# Patient Record
Sex: MALE | Race: WHITE | NOT HISPANIC OR LATINO | Employment: FULL TIME | URBAN - METROPOLITAN AREA
[De-identification: names, ages, dates, MRNs, and addresses within clinical notes are randomized per-mention and may not be internally consistent; named-entity substitution may affect disease eponyms.]

---

## 2023-10-02 ENCOUNTER — TELEPHONE (OUTPATIENT)
Dept: PRIMARY CARE | Facility: CLINIC | Age: 63
End: 2023-10-02
Payer: COMMERCIAL

## 2023-10-11 DIAGNOSIS — E78.5 HYPERLIPIDEMIA, UNSPECIFIED: ICD-10-CM

## 2023-10-13 RX ORDER — ICOSAPENT ETHYL 1000 MG/1
2 CAPSULE ORAL 2 TIMES DAILY
Qty: 360 CAPSULE | Refills: 4 | Status: SHIPPED | OUTPATIENT
Start: 2023-10-13

## 2023-11-02 ENCOUNTER — LAB (OUTPATIENT)
Dept: LAB | Facility: LAB | Age: 63
End: 2023-11-02
Payer: COMMERCIAL

## 2023-11-02 DIAGNOSIS — E11.9 TYPE 2 DIABETES MELLITUS WITHOUT COMPLICATIONS (MULTI): Primary | ICD-10-CM

## 2023-11-02 LAB
ALBUMIN SERPL BCP-MCNC: 4.4 G/DL (ref 3.4–5)
ALP SERPL-CCNC: 91 U/L (ref 33–136)
ALT SERPL W P-5'-P-CCNC: 48 U/L (ref 10–52)
ANION GAP SERPL CALC-SCNC: 12 MMOL/L (ref 10–20)
APPEARANCE UR: CLEAR
AST SERPL W P-5'-P-CCNC: 31 U/L (ref 9–39)
BILIRUB SERPL-MCNC: 0.7 MG/DL (ref 0–1.2)
BILIRUB UR STRIP.AUTO-MCNC: NEGATIVE MG/DL
BUN SERPL-MCNC: 17 MG/DL (ref 6–23)
CALCIUM SERPL-MCNC: 8.9 MG/DL (ref 8.6–10.3)
CHLORIDE SERPL-SCNC: 104 MMOL/L (ref 98–107)
CO2 SERPL-SCNC: 26 MMOL/L (ref 21–32)
COLOR UR: YELLOW
CREAT SERPL-MCNC: 1.04 MG/DL (ref 0.5–1.3)
GFR SERPL CREATININE-BSD FRML MDRD: 80 ML/MIN/1.73M*2
GLUCOSE SERPL-MCNC: 137 MG/DL (ref 74–99)
GLUCOSE UR STRIP.AUTO-MCNC: ABNORMAL MG/DL
KETONES UR STRIP.AUTO-MCNC: ABNORMAL MG/DL
LEUKOCYTE ESTERASE UR QL STRIP.AUTO: NEGATIVE
NITRITE UR QL STRIP.AUTO: NEGATIVE
PH UR STRIP.AUTO: 5 [PH]
POTASSIUM SERPL-SCNC: 4.1 MMOL/L (ref 3.5–5.3)
PROT SERPL-MCNC: 7.1 G/DL (ref 6.4–8.2)
PROT UR STRIP.AUTO-MCNC: NEGATIVE MG/DL
RBC # UR STRIP.AUTO: NEGATIVE /UL
SODIUM SERPL-SCNC: 138 MMOL/L (ref 136–145)
SP GR UR STRIP.AUTO: 1.03
UROBILINOGEN UR STRIP.AUTO-MCNC: <2 MG/DL

## 2023-11-02 PROCEDURE — 36415 COLL VENOUS BLD VENIPUNCTURE: CPT

## 2023-11-02 PROCEDURE — 82570 ASSAY OF URINE CREATININE: CPT

## 2023-11-02 PROCEDURE — 83036 HEMOGLOBIN GLYCOSYLATED A1C: CPT

## 2023-11-02 PROCEDURE — 80053 COMPREHEN METABOLIC PANEL: CPT

## 2023-11-02 PROCEDURE — 82043 UR ALBUMIN QUANTITATIVE: CPT

## 2023-11-02 PROCEDURE — 81003 URINALYSIS AUTO W/O SCOPE: CPT

## 2023-11-03 LAB
CREAT UR-MCNC: 111 MG/DL (ref 20–370)
EST. AVERAGE GLUCOSE BLD GHB EST-MCNC: 151 MG/DL
HBA1C MFR BLD: 6.9 %
MICROALBUMIN UR-MCNC: 7.4 MG/L
MICROALBUMIN/CREAT UR: 6.7 UG/MG CREAT

## 2023-11-10 PROBLEM — E11.9 DIABETES (MULTI): Status: ACTIVE | Noted: 2019-08-19

## 2023-11-10 PROBLEM — N52.9 ERECTILE DYSFUNCTION: Status: ACTIVE | Noted: 2022-04-13

## 2023-11-10 PROBLEM — R07.9 CHEST PAIN: Status: ACTIVE | Noted: 2023-11-10

## 2023-11-10 PROBLEM — L82.1 SEBORRHEIC KERATOSES: Status: ACTIVE | Noted: 2019-08-28

## 2023-11-10 PROBLEM — S43.006A DISLOCATION OF SHOULDER JOINT: Status: ACTIVE | Noted: 2023-11-10

## 2023-11-10 PROBLEM — E78.00 PURE HYPERCHOLESTEROLEMIA, UNSPECIFIED: Status: ACTIVE | Noted: 2020-01-31

## 2023-11-10 PROBLEM — L72.3 SEBACEOUS CYST: Status: ACTIVE | Noted: 2019-08-28

## 2023-11-10 RX ORDER — SILDENAFIL 100 MG/1
100 TABLET, FILM COATED ORAL DAILY
COMMUNITY
Start: 2023-05-09 | End: 2023-11-13 | Stop reason: SDUPTHER

## 2023-11-10 RX ORDER — EMPAGLIFLOZIN 25 MG/1
25 TABLET, FILM COATED ORAL
COMMUNITY
Start: 2023-08-17 | End: 2024-05-14 | Stop reason: SDUPTHER

## 2023-11-10 RX ORDER — ROSUVASTATIN CALCIUM 20 MG/1
20 TABLET, COATED ORAL DAILY
COMMUNITY
End: 2024-05-13

## 2023-11-10 RX ORDER — IBUPROFEN 200 MG
200 TABLET ORAL EVERY 6 HOURS PRN
COMMUNITY
Start: 2016-12-08 | End: 2024-04-06

## 2023-11-10 RX ORDER — MINERAL OIL
ENEMA (ML) RECTAL
COMMUNITY
Start: 2018-04-20

## 2023-11-10 RX ORDER — MULTIVITAMIN
TABLET ORAL
COMMUNITY
Start: 2005-10-06

## 2023-11-10 RX ORDER — ASPIRIN 81 MG/1
81 TABLET ORAL DAILY
COMMUNITY
Start: 2019-08-28

## 2023-11-13 ENCOUNTER — OFFICE VISIT (OUTPATIENT)
Dept: PRIMARY CARE | Facility: CLINIC | Age: 63
End: 2023-11-13
Payer: COMMERCIAL

## 2023-11-13 ENCOUNTER — TELEPHONE (OUTPATIENT)
Dept: PRIMARY CARE | Facility: CLINIC | Age: 63
End: 2023-11-13

## 2023-11-13 VITALS
RESPIRATION RATE: 16 BRPM | WEIGHT: 176 LBS | DIASTOLIC BLOOD PRESSURE: 80 MMHG | SYSTOLIC BLOOD PRESSURE: 132 MMHG | BODY MASS INDEX: 26.76 KG/M2 | OXYGEN SATURATION: 96 % | HEART RATE: 66 BPM

## 2023-11-13 DIAGNOSIS — Z00.00 WELL ADULT EXAM: ICD-10-CM

## 2023-11-13 DIAGNOSIS — E11.9 TYPE 2 DIABETES MELLITUS WITHOUT COMPLICATION, WITHOUT LONG-TERM CURRENT USE OF INSULIN (MULTI): ICD-10-CM

## 2023-11-13 DIAGNOSIS — N52.9 ERECTILE DYSFUNCTION, UNSPECIFIED ERECTILE DYSFUNCTION TYPE: ICD-10-CM

## 2023-11-13 DIAGNOSIS — E11.9 TYPE 2 DIABETES MELLITUS WITHOUT COMPLICATION, WITHOUT LONG-TERM CURRENT USE OF INSULIN (MULTI): Primary | ICD-10-CM

## 2023-11-13 DIAGNOSIS — E78.00 PURE HYPERCHOLESTEROLEMIA, UNSPECIFIED: ICD-10-CM

## 2023-11-13 DIAGNOSIS — Z12.5 SCREENING FOR PROSTATE CANCER: ICD-10-CM

## 2023-11-13 PROBLEM — L82.1 SEBORRHEIC KERATOSES: Status: RESOLVED | Noted: 2019-08-28 | Resolved: 2023-11-13

## 2023-11-13 PROBLEM — L72.3 SEBACEOUS CYST: Status: RESOLVED | Noted: 2019-08-28 | Resolved: 2023-11-13

## 2023-11-13 PROBLEM — S43.006A DISLOCATION OF SHOULDER JOINT: Status: RESOLVED | Noted: 2023-11-10 | Resolved: 2023-11-13

## 2023-11-13 PROBLEM — R07.9 CHEST PAIN: Status: RESOLVED | Noted: 2023-11-10 | Resolved: 2023-11-13

## 2023-11-13 PROCEDURE — 1036F TOBACCO NON-USER: CPT | Performed by: FAMILY MEDICINE

## 2023-11-13 PROCEDURE — 99213 OFFICE O/P EST LOW 20 MIN: CPT | Performed by: FAMILY MEDICINE

## 2023-11-13 PROCEDURE — 3075F SYST BP GE 130 - 139MM HG: CPT | Performed by: FAMILY MEDICINE

## 2023-11-13 PROCEDURE — 3008F BODY MASS INDEX DOCD: CPT | Performed by: FAMILY MEDICINE

## 2023-11-13 PROCEDURE — 1160F RVW MEDS BY RX/DR IN RCRD: CPT | Performed by: FAMILY MEDICINE

## 2023-11-13 PROCEDURE — 1159F MED LIST DOCD IN RCRD: CPT | Performed by: FAMILY MEDICINE

## 2023-11-13 PROCEDURE — 3044F HG A1C LEVEL LT 7.0%: CPT | Performed by: FAMILY MEDICINE

## 2023-11-13 PROCEDURE — 3079F DIAST BP 80-89 MM HG: CPT | Performed by: FAMILY MEDICINE

## 2023-11-13 PROCEDURE — 3061F NEG MICROALBUMINURIA REV: CPT | Performed by: FAMILY MEDICINE

## 2023-11-13 PROCEDURE — 1126F AMNT PAIN NOTED NONE PRSNT: CPT | Performed by: FAMILY MEDICINE

## 2023-11-13 RX ORDER — SILDENAFIL 100 MG/1
100 TABLET, FILM COATED ORAL AS NEEDED
Qty: 10 TABLET | Refills: 0 | Status: SHIPPED | OUTPATIENT
Start: 2023-11-13

## 2023-11-13 ASSESSMENT — PATIENT HEALTH QUESTIONNAIRE - PHQ9
2. FEELING DOWN, DEPRESSED OR HOPELESS: NOT AT ALL
SUM OF ALL RESPONSES TO PHQ9 QUESTIONS 1 AND 2: 0
1. LITTLE INTEREST OR PLEASURE IN DOING THINGS: NOT AT ALL

## 2023-11-13 ASSESSMENT — PAIN SCALES - GENERAL: PAINLEVEL: 0-NO PAIN

## 2023-11-13 ASSESSMENT — ENCOUNTER SYMPTOMS
OCCASIONAL FEELINGS OF UNSTEADINESS: 0
LOSS OF SENSATION IN FEET: 0
DEPRESSION: 0

## 2023-11-13 NOTE — LETTER
November 13, 2023     Patient: Dieter Wesley   YOB: 1960   Date of Visit: 11/13/2023       To Whom It May Concern:    Dieter Wesley was seen in my clinic on 11/13/2023 at 8:00 am. Please excuse Dieter for his absence from work on this day to make the appointment. He should also be excused for a lab appointment on 11/2/2023.    If you have any questions or concerns, please don't hesitate to call.         Sincerely,         Joseph Trimble MD        CC: No Recipients

## 2023-11-13 NOTE — PROGRESS NOTES
Subjective   Patient ID: Dieter Wesley is a 63 y.o. male who presents for Diabetes.    HPI   1. DIETER is seen today also for follow up of Hypercholesterolemia.  He is on Rosuvastatin and Vascepta.     2. DIETER is seen also for follow up of Diabetes 2, non insulin requiring.  He is on Jardiance 25 mg. (increased 5 months ago).   Recent A1 c is 6.9.    Review of Systems  Denies headache, blurred vision, chest pain, shortness of breath, nausea or vomiting, change in bowel habits or leg pain or swelling.    Objective   /80 (BP Location: Left arm, Patient Position: Sitting, BP Cuff Size: Large adult)   Pulse 66   Resp 16   Wt 79.8 kg (176 lb)   SpO2 96%   BMI 26.76 kg/m²     Physical Exam  Vitals and nurse's notes reviewed  General: no acute distress  HEENT: Normal  Neck: Supple  Lungs: Clear  Cardio: RRR w/o murmur  Extremities: No edema, no calf tenderness  Neuro: Alert and oriented with no focal deficits    Assessment/Plan   Problem List Items Addressed This Visit             ICD-10-CM       High    Type 2 diabetes mellitus without complications (CMS/HCC) - Primary E11.9     Continue current medications.  Recheck in 6 months.           Pure hypercholesterolemia, unspecified E78.00     Continue current medications.  Recheck in 6 months.              Medium    Erectile dysfunction N52.9    Relevant Medications    sildenafil (Viagra) 100 mg tablet

## 2024-03-19 ENCOUNTER — CLINICAL SUPPORT (OUTPATIENT)
Dept: AUDIOLOGY | Facility: CLINIC | Age: 64
End: 2024-03-19
Payer: COMMERCIAL

## 2024-03-19 DIAGNOSIS — H93.13 TINNITUS OF BOTH EARS: ICD-10-CM

## 2024-03-19 DIAGNOSIS — H90.3 SENSORINEURAL HEARING LOSS (SNHL) OF BOTH EARS: Primary | ICD-10-CM

## 2024-03-19 PROCEDURE — 92550 TYMPANOMETRY & REFLEX THRESH: CPT | Performed by: AUDIOLOGIST

## 2024-03-19 PROCEDURE — 92557 COMPREHENSIVE HEARING TEST: CPT | Performed by: AUDIOLOGIST

## 2024-03-19 ASSESSMENT — PAIN - FUNCTIONAL ASSESSMENT: PAIN_FUNCTIONAL_ASSESSMENT: 0-10

## 2024-03-19 ASSESSMENT — PAIN SCALES - GENERAL: PAINLEVEL_OUTOF10: 0 - NO PAIN

## 2024-03-19 ASSESSMENT — ENCOUNTER SYMPTOMS: OCCASIONAL FEELINGS OF UNSTEADINESS: 0

## 2024-03-19 NOTE — PROGRESS NOTES
AUDIOLOGY ADULT AUDIOMETRIC EVALUATION    Name:  Dieter Wesley  :  1960  Age:  63 y.o.  Date of Evaluation:  2024    Reason for visit: Mr. Wesley is seen in the clinic today at the request of Max Verma MD in otolaryngology for an audiologic evaluation. Patient complains of tinnitus and difficulty hearing.    HISTORY  Patient reports constant tinnitus (ringing) in both ears of longstanding. He also reports increased difficulty hearing in groups, in background noise, and at a distance. 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 Patient denies aural fullness/pressure, otalgia, otorrhea, dizziness/balance disturbance, a history of otologic surgery, and a family history of hearing loss. Case history was obtained from the patient.    No prior audiologic evaluation is available for comparison.    SCREENINGS  Steadi Fall Risk  One or more falls in the last year? No  Feels unsteady when walking? No  Worried about Falling? No    Domestic Violence Screening  Are you currently or have you recently been threatened or abused physically, emotionally, or sexually by anyone? No  Do you feel UNSAFE going back to the place you are living? No    Pain Assessment  Pain Assessment: 0-10  Pain Score: 0 - No pain    EVALUATION  See scanned audiogram: “Media” > “Audiology Report” > Document ID [id number].    RESULTS  Otoscopic Evaluation  Right Ear: minimal non-occluding cerumen with visualization of the tympanic membrane  Left Ear: minimal non-occluding cerumen with visualization of the tympanic membrane    Immittance Measures  Tympanometry:  Right Ear: Type A, normal tympanic membrane mobility with normal middle ear pressure  Left Ear: Type A, normal tympanic membrane mobility with normal middle ear pressure    Acoustic Reflexes:  Ipsilateral Right Ear: present and normal from 500 Hz to 2000 Hz, absent at 4000 Hz  Ipsilateral Left Ear: present  and normal from 500 Hz to 2000 Hz, absent at 4000 Hz  Contralateral Right Ear: did not evaluate  Contralateral Left Ear: did not evaluate    Distortion Product Otoacoustic Emissions (DPOAEs)  Right Ear: present from 1000 Hz to 2000 Hz, absent from 3000 Hz to 8000 Hz  Left Ear: present from 1000 Hz to 1500 Hz, absent from 2000 Hz to 8000 Hz    Audiometry  Test Technique and Reliability:   Standard audiometry via supra-aural headphones. Pulsed tone stimulus. Reliability is good.    Pure tone air and bone conduction audiometry:  Right Ear: normal hearing sensitivity through 2000 Hz with a moderate to moderately-severe sensorineural hearing loss in the higher frequencies  Left Ear: normal hearing sensitivity through 2000 Hz with a moderate to moderately-severe sensorineural hearing loss in the higher frequencies    Speech Audiometry:  Speech Reception Threshold (SRT) Right Ear: 25 dB HL  Speech Reception Threshold (SRT) Left Ear: 25 dB HL  Word Recognition Score (WRS) Right Ear: Good, 88% at 65 dB HL  Word Recognition Score (WRS) Left Ear: Excellent, 96% at 65 dB HL    IMPRESSIONS  Audiometric evaluation revealed a high frequency sensorineural hearing loss bilaterally. Tympanometry indicates normal tympanic membrane mobility with normal middle ear pressure bilaterally. No prior audiologic evaluation is available for comparison. The presence of acoustic reflexes within normal intensity limits is consistent with normal middle ear and brainstem function, and suggests that auditory sensitivity is not significantly impaired. Present Distortion Product Otoacoustic Emissions (DPOAEs) suggest normal/near normal cochlear outer hair cell function and are consistent with no greater than a mild hearing loss at those frequencies.    Tinnitus management techniques discussed.    RECOMMENDATIONS  - Annual audiologic evaluation, sooner if an acute change is noted.  - Consider hearing aids. Contact insurance to determine if there is an  applicable benefit and where it can be used. Schedule a hearing aid evaluation appointment should he wish to pursue hearing aids through our clinic.  - Follow-up with medical care team as planned.    PATIENT EDUCATION  Discussed results, impressions and recommendations with the patient. Questions were addressed and the patient was encouraged to contact our office should concerns arise.    Time for this encounter: 812-310    Angel Isabel, CCC-A  Licensed Audiologist

## 2024-04-03 ENCOUNTER — APPOINTMENT (OUTPATIENT)
Dept: AUDIOLOGY | Facility: CLINIC | Age: 64
End: 2024-04-03
Payer: COMMERCIAL

## 2024-04-03 ENCOUNTER — OFFICE VISIT (OUTPATIENT)
Dept: OTOLARYNGOLOGY | Facility: CLINIC | Age: 64
End: 2024-04-03
Payer: COMMERCIAL

## 2024-04-03 VITALS — WEIGHT: 172 LBS | HEIGHT: 67 IN | BODY MASS INDEX: 27 KG/M2

## 2024-04-03 DIAGNOSIS — H93.13 TINNITUS OF BOTH EARS: Primary | ICD-10-CM

## 2024-04-03 DIAGNOSIS — H90.3 BILATERAL SENSORINEURAL HEARING LOSS: ICD-10-CM

## 2024-04-03 PROCEDURE — 1160F RVW MEDS BY RX/DR IN RCRD: CPT | Performed by: OTOLARYNGOLOGY

## 2024-04-03 PROCEDURE — 99203 OFFICE O/P NEW LOW 30 MIN: CPT | Performed by: OTOLARYNGOLOGY

## 2024-04-03 PROCEDURE — 3008F BODY MASS INDEX DOCD: CPT | Performed by: OTOLARYNGOLOGY

## 2024-04-03 PROCEDURE — 1159F MED LIST DOCD IN RCRD: CPT | Performed by: OTOLARYNGOLOGY

## 2024-04-03 PROCEDURE — 1036F TOBACCO NON-USER: CPT | Performed by: OTOLARYNGOLOGY

## 2024-04-03 NOTE — PROGRESS NOTES
"[facility]  Dieter Wesley is a 63 y.o. male presents with history of bilateral tinnitus.  Difficult to describe.  Sounds like a seashell.  Nonpulsatile.  No otalgia or otorrhea.  No vertigo.  Audiogram today with bilateral high-frequency sensorineural hearing loss.  Symmetric.  Normal tympanometry.  Normal speech discrimination.  History of noise exposure occupationally many years ago.  Not ongoing.      Past Medical History:   Diagnosis Date    Diabetes (CMS/[facility])             Medications:     Current Outpatient Medications:     aspirin 81 mg EC tablet, Take 1 tablet (81 mg) by mouth once daily., Disp: , Rfl:     fexofenadine (Allegra) 180 mg tablet, Take 1 tablet as needed for allergies, Disp: , Rfl:     ibuprofen 200 mg tablet, Take 1 tablet (200 mg) by mouth every 6 hours if needed., Disp: , Rfl:     Jardiance 25 mg, Take 1 tablet (25 mg) by mouth once daily in the morning. Take before meals., Disp: , Rfl:     multivitamin tablet, 2-3 TABS WEEKLY, Disp: , Rfl:     rosuvastatin (Crestor) 20 mg tablet, Take 1 tablet (20 mg) by mouth once daily., Disp: , Rfl:     sildenafil (Viagra) 100 mg tablet, Take 1 tablet (100 mg) by mouth if needed for erectile dysfunction., Disp: 10 tablet, Rfl: 0    Vascepa 1 gram capsule, TAKE 2 CAPSULES BY MOUTH TWICE A DAY, Disp: 360 capsule, Rfl: 4     Allergies:  Allergies   Allergen Reactions    Grass Pollen Itching     itchy eyes, sneezing.        Physical Exam:  Last Recorded Vitals  Height 1.702 m (5' 7\"), weight 78 kg (172 lb).  General:     General appearance: Well-developed, well-nourished in no acute distress.       Voice:  normal       Head/face: Normal appearance; nontender to palpation     Facial nerve function: Normal and symmetric bilaterally.    Oral/oropharynx:     Oral vestibule: Normal labial and gingival mucosa     Tongue/floor of mouth: Normal without lesion     Oropharynx: Clear.  No lesions present of the hard/soft palate, posterior pharynx    Neck:     Neck: Normal " appearance, trachea midline     Salivary glands: Normal to palpation bilaterally     Lymph nodes: No cervical lymphadenopathy to palpation     Thyroid: No thyromegaly.  No palpable nodules     Range of motion: Normal    Neurological:     Cortical functions: Alert and oriented x3, appropriate affect       Larynx/hypopharynx:     Laryngeal findings: Mirror exam inadequate or limited secondary to enlarged base of tongue and/or excessive gagging    Ear:     Ear canal: Normal bilaterally     Tympanic membrane: Intact and mobile bilaterally     Pinna: Normal bilaterally     Hearing:  Gross hearing assessment normal by voice    Nose:     Visualized using: Anterior rhinoscopy     Nasopharynx: Inadequate mirror exam secondary to gag, anatomy.       Nasal dorsum: Nontraumatic midline appearance     Septum: Midline     Inferior turbinates: Normally sized     Mucosa: Bilateral, pink, normal appearing       ASSESSMENT/PLAN:  Tinnitus protocol reviewed.  Borderline hearing aid candidate.  We discussed.  Recommend masking.  Recommend audiogram in a year to monitor.  Recheck sooner as needed with change        Max Verma MD

## 2024-04-06 ENCOUNTER — HOSPITAL ENCOUNTER (EMERGENCY)
Facility: HOSPITAL | Age: 64
Discharge: HOME | End: 2024-04-06
Attending: EMERGENCY MEDICINE
Payer: COMMERCIAL

## 2024-04-06 ENCOUNTER — APPOINTMENT (OUTPATIENT)
Dept: RADIOLOGY | Facility: HOSPITAL | Age: 64
End: 2024-04-06
Payer: COMMERCIAL

## 2024-04-06 VITALS
DIASTOLIC BLOOD PRESSURE: 91 MMHG | OXYGEN SATURATION: 96 % | RESPIRATION RATE: 16 BRPM | HEIGHT: 68 IN | TEMPERATURE: 98.1 F | WEIGHT: 174.38 LBS | SYSTOLIC BLOOD PRESSURE: 161 MMHG | HEART RATE: 60 BPM | BODY MASS INDEX: 26.43 KG/M2

## 2024-04-06 DIAGNOSIS — M25.511 ACUTE PAIN OF RIGHT SHOULDER: Primary | ICD-10-CM

## 2024-04-06 PROCEDURE — 73030 X-RAY EXAM OF SHOULDER: CPT | Mod: RT

## 2024-04-06 PROCEDURE — 73030 X-RAY EXAM OF SHOULDER: CPT | Mod: RIGHT SIDE | Performed by: RADIOLOGY

## 2024-04-06 PROCEDURE — 96372 THER/PROPH/DIAG INJ SC/IM: CPT | Performed by: EMERGENCY MEDICINE

## 2024-04-06 PROCEDURE — 2500000001 HC RX 250 WO HCPCS SELF ADMINISTERED DRUGS (ALT 637 FOR MEDICARE OP): Performed by: EMERGENCY MEDICINE

## 2024-04-06 PROCEDURE — 2500000004 HC RX 250 GENERAL PHARMACY W/ HCPCS (ALT 636 FOR OP/ED): Performed by: EMERGENCY MEDICINE

## 2024-04-06 PROCEDURE — 99283 EMERGENCY DEPT VISIT LOW MDM: CPT

## 2024-04-06 RX ORDER — IBUPROFEN 600 MG/1
600 TABLET ORAL EVERY 8 HOURS PRN
Qty: 15 TABLET | Refills: 0 | Status: SHIPPED | OUTPATIENT
Start: 2024-04-06 | End: 2024-04-16

## 2024-04-06 RX ORDER — OXYCODONE AND ACETAMINOPHEN 5; 325 MG/1; MG/1
1 TABLET ORAL ONCE
Status: COMPLETED | OUTPATIENT
Start: 2024-04-06 | End: 2024-04-06

## 2024-04-06 RX ORDER — KETOROLAC TROMETHAMINE 30 MG/ML
30 INJECTION, SOLUTION INTRAMUSCULAR; INTRAVENOUS ONCE
Status: COMPLETED | OUTPATIENT
Start: 2024-04-06 | End: 2024-04-06

## 2024-04-06 RX ADMIN — OXYCODONE HYDROCHLORIDE AND ACETAMINOPHEN 1 TABLET: 5; 325 TABLET ORAL at 14:50

## 2024-04-06 RX ADMIN — KETOROLAC TROMETHAMINE 30 MG: 30 INJECTION, SOLUTION INTRAMUSCULAR at 13:55

## 2024-04-06 ASSESSMENT — COLUMBIA-SUICIDE SEVERITY RATING SCALE - C-SSRS
6. HAVE YOU EVER DONE ANYTHING, STARTED TO DO ANYTHING, OR PREPARED TO DO ANYTHING TO END YOUR LIFE?: NO
2. HAVE YOU ACTUALLY HAD ANY THOUGHTS OF KILLING YOURSELF?: NO
1. IN THE PAST MONTH, HAVE YOU WISHED YOU WERE DEAD OR WISHED YOU COULD GO TO SLEEP AND NOT WAKE UP?: NO

## 2024-04-06 ASSESSMENT — PAIN DESCRIPTION - PAIN TYPE: TYPE: ACUTE PAIN

## 2024-04-06 ASSESSMENT — PAIN DESCRIPTION - PROGRESSION: CLINICAL_PROGRESSION: GRADUALLY WORSENING

## 2024-04-06 ASSESSMENT — PAIN DESCRIPTION - LOCATION: LOCATION: SHOULDER

## 2024-04-06 ASSESSMENT — PAIN DESCRIPTION - DESCRIPTORS: DESCRIPTORS: ACHING

## 2024-04-06 ASSESSMENT — PAIN DESCRIPTION - ORIENTATION: ORIENTATION: RIGHT

## 2024-04-06 ASSESSMENT — PAIN SCALES - GENERAL
PAINLEVEL_OUTOF10: 5 - MODERATE PAIN
PAINLEVEL_OUTOF10: 5 - MODERATE PAIN

## 2024-04-06 ASSESSMENT — PAIN DESCRIPTION - ONSET: ONSET: GRADUAL

## 2024-04-06 ASSESSMENT — PAIN DESCRIPTION - FREQUENCY: FREQUENCY: CONSTANT/CONTINUOUS

## 2024-04-06 ASSESSMENT — PAIN - FUNCTIONAL ASSESSMENT
PAIN_FUNCTIONAL_ASSESSMENT: 0-10
PAIN_FUNCTIONAL_ASSESSMENT: 0-10

## 2024-04-06 NOTE — ED PROVIDER NOTES
HPI   Chief Complaint   Patient presents with    Shoulder Pain     Since Thursday, no falls or trauma, no shoulder issues, moving makes it worse or different, no known trauma, good ROM and MSP       The patient is a 63-year-old male who presents for evaluation of right shoulder pain.  Patient states that 2 days ago he began to notice some pain in the right shoulder.  This was not associated with any injury or trauma.  He does not recall any specific activity that was associated with the onset of the pain.  The onset was somewhat mild and insidious.  Pain has gotten worse over time and has made it difficult for him to sleep.  The pain is worse with shoulder movements.  He points to the posterior aspect of the shoulder as the location of maximal pain.  He also has been having some pain in the right trapezius musculature.  He is not having any neck pain.  Sometimes it feels like the pain radiates down his arm.  No numbness or weakness in the right upper extremity.  No history of shoulder problems in the past.  No chest pain or shortness of breath.  No recent fevers.                          No data recorded                   Patient History   Past Medical History:   Diagnosis Date    Diabetes (CMS/HCC)      Past Surgical History:   Procedure Laterality Date    SHOULDER SURGERY       No family history on file.  Social History     Tobacco Use    Smoking status: Never     Passive exposure: Never    Smokeless tobacco: Never   Substance Use Topics    Alcohol use: Yes     Alcohol/week: 7.0 standard drinks of alcohol     Types: 7 Cans of beer per week    Drug use: Never       Physical Exam   ED Triage Vitals [04/06/24 1300]   Temperature Heart Rate Respirations BP   36.7 °C (98.1 °F) 60 16 (!) 161/91      Pulse Ox Temp Source Heart Rate Source Patient Position   96 % Oral Monitor Sitting      BP Location FiO2 (%)     Right arm --       Physical Exam  Constitutional:       General: He is not in acute distress.     Appearance:  Normal appearance. He is not ill-appearing.   HENT:      Head: Normocephalic and atraumatic.      Mouth/Throat:      Mouth: Mucous membranes are moist.      Pharynx: Oropharynx is clear. No oropharyngeal exudate or posterior oropharyngeal erythema.   Eyes:      General:         Right eye: No discharge.         Left eye: No discharge.      Extraocular Movements: Extraocular movements intact.      Conjunctiva/sclera: Conjunctivae normal.      Pupils: Pupils are equal, round, and reactive to light.   Cardiovascular:      Rate and Rhythm: Normal rate and regular rhythm.      Heart sounds: No murmur heard.  Pulmonary:      Effort: Pulmonary effort is normal.      Breath sounds: Normal breath sounds. No wheezing, rhonchi or rales.   Abdominal:      General: Abdomen is flat. There is no distension.      Palpations: Abdomen is soft.      Tenderness: There is no abdominal tenderness.   Musculoskeletal:      Cervical back: Normal range of motion and neck supple. No rigidity or tenderness.      Comments: There is tenderness with palpation at the posterior aspect of the right shoulder.  The patient has increased discomfort with internal and external rotation as well as abduction against resistance.  There is some mild tenderness with palpation in the right trapezius musculature.  There is no C-spine tenderness.  No motor or sensory deficits are detected in the right upper extremity.   Lymphadenopathy:      Cervical: No cervical adenopathy.   Skin:     General: Skin is warm and dry.      Findings: No rash.   Neurological:      Mental Status: He is alert.      Comments: No motor or sensory deficits are detected in the right upper extremity.   Psychiatric:         Mood and Affect: Mood normal.         Behavior: Behavior normal.         ED Course & MDM   Diagnoses as of 04/06/24 1442   Acute pain of right shoulder       Medical Decision Making  The patient was treated with an IM dose of Toradol.  X-rays of the right shoulder were  read by the radiologist as normal.    Findings are consistent with right shoulder pain related to suspected soft tissue inflammation.  This could be from and unrecognized injury.  There is no redness or increased warmth and so I do not suspect a septic arthritis or infectious etiology.  Based on the description of the pain and my physical exam findings, I do not think that this represents a radicular type pain.  I do not feel that this represents a cardiac problem.  The patient was placed in a sling.  He will be given a dose of Percocet prior to discharge.  I am discharging him home on ibuprofen with sling to be worn for comfort.  I recommended alternating ice and heat therapy.  He was given orthopedic referral for follow-up in 2 to 3 days and encouraged to return if feeling worse in any way.        Procedure  Procedures     Jewel Berman DO  04/06/24 1447

## 2024-04-06 NOTE — DISCHARGE INSTRUCTIONS
Wear sling for comfort.    Alternate ice and heat therapy.    Follow-up with the specialist below in 2 to 3 days.

## 2024-05-07 ENCOUNTER — LAB (OUTPATIENT)
Dept: LAB | Facility: LAB | Age: 64
End: 2024-05-07
Payer: COMMERCIAL

## 2024-05-07 DIAGNOSIS — Z00.00 WELL ADULT EXAM: ICD-10-CM

## 2024-05-07 DIAGNOSIS — Z12.5 SCREENING FOR PROSTATE CANCER: ICD-10-CM

## 2024-05-07 DIAGNOSIS — E78.00 PURE HYPERCHOLESTEROLEMIA, UNSPECIFIED: ICD-10-CM

## 2024-05-07 DIAGNOSIS — E11.9 TYPE 2 DIABETES MELLITUS WITHOUT COMPLICATION, WITHOUT LONG-TERM CURRENT USE OF INSULIN (MULTI): ICD-10-CM

## 2024-05-07 LAB
ALBUMIN SERPL BCP-MCNC: 4.5 G/DL (ref 3.4–5)
ALP SERPL-CCNC: 73 U/L (ref 33–136)
ALT SERPL W P-5'-P-CCNC: 32 U/L (ref 10–52)
ANION GAP SERPL CALC-SCNC: 11 MMOL/L (ref 10–20)
APPEARANCE UR: CLEAR
AST SERPL W P-5'-P-CCNC: 24 U/L (ref 9–39)
BASOPHILS # BLD AUTO: 0.02 X10*3/UL (ref 0–0.1)
BASOPHILS NFR BLD AUTO: 0.4 %
BILIRUB SERPL-MCNC: 0.8 MG/DL (ref 0–1.2)
BILIRUB UR STRIP.AUTO-MCNC: NEGATIVE MG/DL
BUN SERPL-MCNC: 14 MG/DL (ref 6–23)
CALCIUM SERPL-MCNC: 9.2 MG/DL (ref 8.6–10.3)
CHLORIDE SERPL-SCNC: 101 MMOL/L (ref 98–107)
CHOLEST SERPL-MCNC: 170 MG/DL (ref 0–199)
CHOLESTEROL/HDL RATIO: 4.2
CO2 SERPL-SCNC: 30 MMOL/L (ref 21–32)
COLOR UR: YELLOW
CREAT SERPL-MCNC: 1.02 MG/DL (ref 0.5–1.3)
EGFRCR SERPLBLD CKD-EPI 2021: 82 ML/MIN/1.73M*2
EOSINOPHIL # BLD AUTO: 0.14 X10*3/UL (ref 0–0.7)
EOSINOPHIL NFR BLD AUTO: 2.8 %
ERYTHROCYTE [DISTWIDTH] IN BLOOD BY AUTOMATED COUNT: 13.5 % (ref 11.5–14.5)
EST. AVERAGE GLUCOSE BLD GHB EST-MCNC: 169 MG/DL
GLUCOSE SERPL-MCNC: 138 MG/DL (ref 74–99)
GLUCOSE UR STRIP.AUTO-MCNC: ABNORMAL MG/DL
HBA1C MFR BLD: 7.5 %
HCT VFR BLD AUTO: 52.2 % (ref 41–52)
HDLC SERPL-MCNC: 40.7 MG/DL
HGB BLD-MCNC: 17 G/DL (ref 13.5–17.5)
IMM GRANULOCYTES # BLD AUTO: 0.02 X10*3/UL (ref 0–0.7)
IMM GRANULOCYTES NFR BLD AUTO: 0.4 % (ref 0–0.9)
KETONES UR STRIP.AUTO-MCNC: NEGATIVE MG/DL
LDLC SERPL CALC-MCNC: 63 MG/DL
LEUKOCYTE ESTERASE UR QL STRIP.AUTO: NEGATIVE
LYMPHOCYTES # BLD AUTO: 1.13 X10*3/UL (ref 1.2–4.8)
LYMPHOCYTES NFR BLD AUTO: 22.6 %
MCH RBC QN AUTO: 29.8 PG (ref 26–34)
MCHC RBC AUTO-ENTMCNC: 32.6 G/DL (ref 32–36)
MCV RBC AUTO: 92 FL (ref 80–100)
MONOCYTES # BLD AUTO: 0.6 X10*3/UL (ref 0.1–1)
MONOCYTES NFR BLD AUTO: 12 %
NEUTROPHILS # BLD AUTO: 3.09 X10*3/UL (ref 1.2–7.7)
NEUTROPHILS NFR BLD AUTO: 61.8 %
NITRITE UR QL STRIP.AUTO: NEGATIVE
NON HDL CHOLESTEROL: 129 MG/DL (ref 0–149)
NRBC BLD-RTO: 0 /100 WBCS (ref 0–0)
PH UR STRIP.AUTO: 6 [PH]
PLATELET # BLD AUTO: 197 X10*3/UL (ref 150–450)
POTASSIUM SERPL-SCNC: 4.5 MMOL/L (ref 3.5–5.3)
PROT SERPL-MCNC: 7 G/DL (ref 6.4–8.2)
PROT UR STRIP.AUTO-MCNC: NEGATIVE MG/DL
RBC # BLD AUTO: 5.7 X10*6/UL (ref 4.5–5.9)
RBC # UR STRIP.AUTO: NEGATIVE /UL
SODIUM SERPL-SCNC: 137 MMOL/L (ref 136–145)
SP GR UR STRIP.AUTO: 1.01
TRIGL SERPL-MCNC: 334 MG/DL (ref 0–149)
UROBILINOGEN UR STRIP.AUTO-MCNC: ABNORMAL MG/DL
VLDL: 67 MG/DL (ref 0–40)
WBC # BLD AUTO: 5 X10*3/UL (ref 4.4–11.3)

## 2024-05-07 PROCEDURE — 80053 COMPREHEN METABOLIC PANEL: CPT

## 2024-05-07 PROCEDURE — 36415 COLL VENOUS BLD VENIPUNCTURE: CPT

## 2024-05-07 PROCEDURE — 80061 LIPID PANEL: CPT

## 2024-05-07 PROCEDURE — 84153 ASSAY OF PSA TOTAL: CPT

## 2024-05-07 PROCEDURE — 84154 ASSAY OF PSA FREE: CPT

## 2024-05-07 PROCEDURE — 83036 HEMOGLOBIN GLYCOSYLATED A1C: CPT

## 2024-05-07 PROCEDURE — 85025 COMPLETE CBC W/AUTO DIFF WBC: CPT

## 2024-05-07 PROCEDURE — 81003 URINALYSIS AUTO W/O SCOPE: CPT

## 2024-05-07 ASSESSMENT — PROMIS GLOBAL HEALTH SCALE
RATE_MENTAL_HEALTH: EXCELLENT
CARRYOUT_SOCIAL_ACTIVITIES: EXCELLENT
RATE_GENERAL_HEALTH: GOOD
RATE_SOCIAL_SATISFACTION: EXCELLENT
RATE_AVERAGE_PAIN: 4
RATE_PHYSICAL_HEALTH: VERY GOOD
RATE_AVERAGE_FATIGUE: MILD
RATE_QUALITY_OF_LIFE: GOOD
EMOTIONAL_PROBLEMS: NEVER
CARRYOUT_PHYSICAL_ACTIVITIES: COMPLETELY

## 2024-05-09 LAB
PSA FREE MFR SERPL: 15 %
PSA FREE SERPL-MCNC: 0.5 NG/ML
PSA SERPL IA-MCNC: 3.4 NG/ML (ref 0–4)

## 2024-05-11 DIAGNOSIS — E78.00 PURE HYPERCHOLESTEROLEMIA, UNSPECIFIED: ICD-10-CM

## 2024-05-13 RX ORDER — ROSUVASTATIN CALCIUM 20 MG/1
20 TABLET, COATED ORAL DAILY
Qty: 90 TABLET | Refills: 3 | Status: SHIPPED | OUTPATIENT
Start: 2024-05-13

## 2024-05-14 ENCOUNTER — OFFICE VISIT (OUTPATIENT)
Dept: PRIMARY CARE | Facility: CLINIC | Age: 64
End: 2024-05-14
Payer: COMMERCIAL

## 2024-05-14 ENCOUNTER — TELEPHONE (OUTPATIENT)
Dept: PRIMARY CARE | Facility: CLINIC | Age: 64
End: 2024-05-14

## 2024-05-14 VITALS
BODY MASS INDEX: 25.76 KG/M2 | RESPIRATION RATE: 18 BRPM | SYSTOLIC BLOOD PRESSURE: 122 MMHG | HEART RATE: 60 BPM | DIASTOLIC BLOOD PRESSURE: 76 MMHG | HEIGHT: 68 IN | OXYGEN SATURATION: 98 % | WEIGHT: 170 LBS

## 2024-05-14 DIAGNOSIS — E11.9 TYPE 2 DIABETES MELLITUS WITHOUT COMPLICATION, WITHOUT LONG-TERM CURRENT USE OF INSULIN (MULTI): ICD-10-CM

## 2024-05-14 DIAGNOSIS — Z00.00 WELL ADULT EXAM: ICD-10-CM

## 2024-05-14 DIAGNOSIS — E78.00 PURE HYPERCHOLESTEROLEMIA, UNSPECIFIED: Primary | ICD-10-CM

## 2024-05-14 DIAGNOSIS — E78.00 PURE HYPERCHOLESTEROLEMIA, UNSPECIFIED: ICD-10-CM

## 2024-05-14 PROCEDURE — 3048F LDL-C <100 MG/DL: CPT | Performed by: FAMILY MEDICINE

## 2024-05-14 PROCEDURE — 3074F SYST BP LT 130 MM HG: CPT | Performed by: FAMILY MEDICINE

## 2024-05-14 PROCEDURE — 99397 PER PM REEVAL EST PAT 65+ YR: CPT | Performed by: FAMILY MEDICINE

## 2024-05-14 PROCEDURE — 3051F HG A1C>EQUAL 7.0%<8.0%: CPT | Performed by: FAMILY MEDICINE

## 2024-05-14 PROCEDURE — 1160F RVW MEDS BY RX/DR IN RCRD: CPT | Performed by: FAMILY MEDICINE

## 2024-05-14 PROCEDURE — 1159F MED LIST DOCD IN RCRD: CPT | Performed by: FAMILY MEDICINE

## 2024-05-14 PROCEDURE — 1125F AMNT PAIN NOTED PAIN PRSNT: CPT | Performed by: FAMILY MEDICINE

## 2024-05-14 PROCEDURE — 3078F DIAST BP <80 MM HG: CPT | Performed by: FAMILY MEDICINE

## 2024-05-14 PROCEDURE — 99212 OFFICE O/P EST SF 10 MIN: CPT | Performed by: FAMILY MEDICINE

## 2024-05-14 PROCEDURE — 1036F TOBACCO NON-USER: CPT | Performed by: FAMILY MEDICINE

## 2024-05-14 RX ORDER — EMPAGLIFLOZIN 25 MG/1
25 TABLET, FILM COATED ORAL
Qty: 90 TABLET | Refills: 3 | Status: SHIPPED | OUTPATIENT
Start: 2024-05-14

## 2024-05-14 ASSESSMENT — PAIN SCALES - GENERAL: PAINLEVEL: 4

## 2024-05-14 ASSESSMENT — ENCOUNTER SYMPTOMS
DEPRESSION: 0
OCCASIONAL FEELINGS OF UNSTEADINESS: 0
LOSS OF SENSATION IN FEET: 0

## 2024-05-14 ASSESSMENT — PATIENT HEALTH QUESTIONNAIRE - PHQ9
1. LITTLE INTEREST OR PLEASURE IN DOING THINGS: NOT AT ALL
2. FEELING DOWN, DEPRESSED OR HOPELESS: NOT AT ALL
SUM OF ALL RESPONSES TO PHQ9 QUESTIONS 1 AND 2: 0

## 2024-05-14 NOTE — PROGRESS NOTES
"Subjective   Patient ID: Dieter Wesley is a 63 y.o. male who presents for Annual Exam (Patient is here for his annual wellness exam).    HPI   1. DIETER is seen for for his comprehensive physical exam. PMH, PSH, family history and social history were reviewed and updated.  Colonoscopy in 2022 by Dr. Ariza was normal.       2. DIETER is seen today also for follow up of Hypercholesterolemia.  He is on Rosuvastatin and Vascepta .Recent LDL is 71.     3. DIETER is seen today also for follow up of allergies.  His SX are controlled by Allegra PRN.     4. DIETER is seen also for follow up of Diabetes 2, non insulin requiring.  He is on Jardiance 25 mg.   Recent A1 c is 7.5  He admits to not following a good diet over the past 6 months or so.He had a calcium CT score of 0 in 12/22.     Review of Systems  Denies headache, blurred vision, chest pain, shortness of breath, nausea or vomiting, change in bowel habits or leg pain or swelling.    Objective   /76 (BP Location: Left arm, Patient Position: Sitting, BP Cuff Size: Large adult)   Pulse 60   Resp 18   Ht 1.727 m (5' 8\")   Wt 77.1 kg (170 lb)   SpO2 98%   BMI 25.85 kg/m²     Physical Exam  General appearance: Vital signs have been reviewed.  Comfortable.  Well-nourished and well-developed.He is alert and oriented x3 and appears his stated age.The patient is cooperative with exam.  Head: Hair pattern reveals a normal pattern for patient's age and face shows no abnormalities.  Eyes: PERRLA x2, EOMI x2, conjunctive a and sclera are clear.  Ears: External bilateral ears with normal helix, tragus and earlobe.Bilateral canals are normal.Bilateral tympanic membranes are pearly gray and landmarks are well visualized.  Nose: Nasal mucosa both nostrils reveals no polyps, ulcerations or lesions.  Throat:Teeth are in good repair.  Posterior pharynx reveals no abnormalities.  Neck: Supple without lymphadenopathy, thyromegaly or carotid bruits.  Lungs:Clear to auscultation " bilaterally with no wheezes, rales or rhonchi.  Cardiovascular: RRR without MRG.No carotid bruits.  Extremities without edema and pulses are intact.  Abdomen: Soft, NT, no masses, no hepatosplenomegaly.  Genitalia: No testicular masses.  No evidence of inguinal hernia.Nontender.  Rectal: No masses.  Prostate is normal in size and shape with no nodules. It is nontender.  Musculoskeletal: 5/5 and equal strength in bilateral upper and lower extremities.  Skin: Good turgor and without rashes.  Neurological: Good overall strength and no focal deficits.  Cranial nerves II through XII are grossly intact.  Psychiatric: Patient has appropriate judgment with good insight.  Mood is appropriate.    Assessment/Plan

## 2024-05-14 NOTE — LETTER
May 14, 2024     Patient: Dieter Wesley   YOB: 1960   Date of Visit: 5/14/2024       To Whom It May Concern:    Dieter Wesley was seen in my clinic on 5/14/2024 at 9:30 am. Please excuse Dieter for his absence from work on this day to make the appointment.    If you have any questions or concerns, please don't hesitate to call.         Sincerely,         Joseph Trimble MD        CC: No Recipients

## 2024-10-21 DIAGNOSIS — E78.5 HYPERLIPIDEMIA, UNSPECIFIED: ICD-10-CM

## 2024-10-21 RX ORDER — ICOSAPENT ETHYL 1 G/1
2 CAPSULE ORAL 2 TIMES DAILY
Qty: 360 CAPSULE | Refills: 4 | Status: SHIPPED | OUTPATIENT
Start: 2024-10-21

## 2024-11-05 ENCOUNTER — LAB (OUTPATIENT)
Dept: LAB | Facility: LAB | Age: 64
End: 2024-11-05
Payer: COMMERCIAL

## 2024-11-05 DIAGNOSIS — E78.00 PURE HYPERCHOLESTEROLEMIA, UNSPECIFIED: ICD-10-CM

## 2024-11-05 DIAGNOSIS — E11.9 TYPE 2 DIABETES MELLITUS WITHOUT COMPLICATION, WITHOUT LONG-TERM CURRENT USE OF INSULIN (MULTI): ICD-10-CM

## 2024-11-05 LAB
ALBUMIN SERPL BCP-MCNC: 4.4 G/DL (ref 3.4–5)
ALP SERPL-CCNC: 77 U/L (ref 33–136)
ALT SERPL W P-5'-P-CCNC: 48 U/L (ref 10–52)
ANION GAP SERPL CALC-SCNC: 16 MMOL/L (ref 10–20)
APPEARANCE UR: CLEAR
AST SERPL W P-5'-P-CCNC: 30 U/L (ref 9–39)
BILIRUB SERPL-MCNC: 0.6 MG/DL (ref 0–1.2)
BILIRUB UR STRIP.AUTO-MCNC: NEGATIVE MG/DL
BUN SERPL-MCNC: 17 MG/DL (ref 6–23)
CALCIUM SERPL-MCNC: 9 MG/DL (ref 8.6–10.3)
CHLORIDE SERPL-SCNC: 102 MMOL/L (ref 98–107)
CHOLEST SERPL-MCNC: 196 MG/DL (ref 0–199)
CHOLESTEROL/HDL RATIO: 5.2
CO2 SERPL-SCNC: 25 MMOL/L (ref 21–32)
COLOR UR: YELLOW
CREAT SERPL-MCNC: 0.9 MG/DL (ref 0.5–1.3)
EGFRCR SERPLBLD CKD-EPI 2021: >90 ML/MIN/1.73M*2
EST. AVERAGE GLUCOSE BLD GHB EST-MCNC: 171 MG/DL
GLUCOSE SERPL-MCNC: 164 MG/DL (ref 74–99)
GLUCOSE UR STRIP.AUTO-MCNC: ABNORMAL MG/DL
HBA1C MFR BLD: 7.6 %
HDLC SERPL-MCNC: 37.5 MG/DL
KETONES UR STRIP.AUTO-MCNC: NEGATIVE MG/DL
LDLC SERPL CALC-MCNC: ABNORMAL MG/DL
LEUKOCYTE ESTERASE UR QL STRIP.AUTO: NEGATIVE
NITRITE UR QL STRIP.AUTO: NEGATIVE
NON HDL CHOLESTEROL: 159 MG/DL (ref 0–149)
PH UR STRIP.AUTO: 5 [PH]
POTASSIUM SERPL-SCNC: 4.1 MMOL/L (ref 3.5–5.3)
PROT SERPL-MCNC: 6.8 G/DL (ref 6.4–8.2)
PROT UR STRIP.AUTO-MCNC: NEGATIVE MG/DL
RBC # UR STRIP.AUTO: NEGATIVE /UL
SODIUM SERPL-SCNC: 139 MMOL/L (ref 136–145)
SP GR UR STRIP.AUTO: 1.05
TRIGL SERPL-MCNC: 600 MG/DL (ref 0–149)
UROBILINOGEN UR STRIP.AUTO-MCNC: NORMAL MG/DL
VLDL: ABNORMAL

## 2024-11-05 PROCEDURE — 80061 LIPID PANEL: CPT

## 2024-11-05 PROCEDURE — 80053 COMPREHEN METABOLIC PANEL: CPT

## 2024-11-05 PROCEDURE — 83036 HEMOGLOBIN GLYCOSYLATED A1C: CPT

## 2024-11-05 PROCEDURE — 36415 COLL VENOUS BLD VENIPUNCTURE: CPT

## 2024-11-05 PROCEDURE — 81003 URINALYSIS AUTO W/O SCOPE: CPT

## 2024-11-09 ASSESSMENT — ENCOUNTER SYMPTOMS
VISUAL CHANGE: 0
SPEECH DIFFICULTY: 0
TREMORS: 0
WEIGHT LOSS: 0
NERVOUS/ANXIOUS: 0
SWEATS: 0
HEADACHES: 0
FATIGUE: 0
POLYPHAGIA: 1
POLYDIPSIA: 0
BLURRED VISION: 0
CONFUSION: 0
DIZZINESS: 0
WEAKNESS: 0
HUNGER: 0
BLACKOUTS: 0
SEIZURES: 0

## 2024-11-15 ENCOUNTER — TELEPHONE (OUTPATIENT)
Dept: PRIMARY CARE | Facility: CLINIC | Age: 64
End: 2024-11-15

## 2024-11-15 ENCOUNTER — APPOINTMENT (OUTPATIENT)
Dept: PRIMARY CARE | Facility: CLINIC | Age: 64
End: 2024-11-15
Payer: COMMERCIAL

## 2024-11-15 VITALS
SYSTOLIC BLOOD PRESSURE: 110 MMHG | DIASTOLIC BLOOD PRESSURE: 64 MMHG | WEIGHT: 174 LBS | BODY MASS INDEX: 26.46 KG/M2 | HEART RATE: 70 BPM | TEMPERATURE: 96.8 F | RESPIRATION RATE: 16 BRPM | OXYGEN SATURATION: 96 %

## 2024-11-15 DIAGNOSIS — E11.9 TYPE 2 DIABETES MELLITUS WITHOUT COMPLICATION, WITHOUT LONG-TERM CURRENT USE OF INSULIN (MULTI): Primary | ICD-10-CM

## 2024-11-15 DIAGNOSIS — Z00.00 WELL ADULT EXAM: Primary | ICD-10-CM

## 2024-11-15 DIAGNOSIS — Z12.5 SCREENING FOR PROSTATE CANCER: ICD-10-CM

## 2024-11-15 DIAGNOSIS — E78.00 PURE HYPERCHOLESTEROLEMIA, UNSPECIFIED: ICD-10-CM

## 2024-11-15 DIAGNOSIS — N52.9 ERECTILE DYSFUNCTION, UNSPECIFIED ERECTILE DYSFUNCTION TYPE: ICD-10-CM

## 2024-11-15 DIAGNOSIS — E11.9 TYPE 2 DIABETES MELLITUS WITHOUT COMPLICATION, WITHOUT LONG-TERM CURRENT USE OF INSULIN (MULTI): ICD-10-CM

## 2024-11-15 PROBLEM — G58.9 PINCHED NERVE IN NECK: Status: ACTIVE | Noted: 2024-04-04

## 2024-11-15 PROBLEM — D23.61: Status: ACTIVE | Noted: 2024-02-01

## 2024-11-15 PROCEDURE — 3078F DIAST BP <80 MM HG: CPT | Performed by: FAMILY MEDICINE

## 2024-11-15 PROCEDURE — 3051F HG A1C>EQUAL 7.0%<8.0%: CPT | Performed by: FAMILY MEDICINE

## 2024-11-15 PROCEDURE — 3048F LDL-C <100 MG/DL: CPT | Performed by: FAMILY MEDICINE

## 2024-11-15 PROCEDURE — 1159F MED LIST DOCD IN RCRD: CPT | Performed by: FAMILY MEDICINE

## 2024-11-15 PROCEDURE — 99213 OFFICE O/P EST LOW 20 MIN: CPT | Performed by: FAMILY MEDICINE

## 2024-11-15 PROCEDURE — 1126F AMNT PAIN NOTED NONE PRSNT: CPT | Performed by: FAMILY MEDICINE

## 2024-11-15 PROCEDURE — 1123F ACP DISCUSS/DSCN MKR DOCD: CPT | Performed by: FAMILY MEDICINE

## 2024-11-15 PROCEDURE — 3074F SYST BP LT 130 MM HG: CPT | Performed by: FAMILY MEDICINE

## 2024-11-15 PROCEDURE — 1036F TOBACCO NON-USER: CPT | Performed by: FAMILY MEDICINE

## 2024-11-15 RX ORDER — SILDENAFIL 100 MG/1
100 TABLET, FILM COATED ORAL AS NEEDED
Qty: 10 TABLET | Refills: 0 | Status: SHIPPED | OUTPATIENT
Start: 2024-11-15

## 2024-11-15 RX ORDER — METFORMIN HYDROCHLORIDE 500 MG/1
500 TABLET, EXTENDED RELEASE ORAL
Qty: 90 TABLET | Refills: 3 | Status: SHIPPED | OUTPATIENT
Start: 2024-11-15 | End: 2025-12-20

## 2024-11-15 ASSESSMENT — PATIENT HEALTH QUESTIONNAIRE - PHQ9
SUM OF ALL RESPONSES TO PHQ9 QUESTIONS 1 AND 2: 0
2. FEELING DOWN, DEPRESSED OR HOPELESS: NOT AT ALL
1. LITTLE INTEREST OR PLEASURE IN DOING THINGS: NOT AT ALL

## 2024-11-15 ASSESSMENT — PAIN SCALES - GENERAL: PAINLEVEL_OUTOF10: 0-NO PAIN

## 2024-11-15 ASSESSMENT — ENCOUNTER SYMPTOMS
DEPRESSION: 0
OCCASIONAL FEELINGS OF UNSTEADINESS: 0
LOSS OF SENSATION IN FEET: 0

## 2024-11-15 NOTE — PROGRESS NOTES
Subjective   Patient ID: Dieter Wesley is a 64 y.o. male who presents for Diabetes (Patient is here for a DM check, patient refused the flu shot today) and Hyperlipidemia (Patient is here for a CHOL check).    HPI   1. DIETER is seen today  for follow up of Hypercholesterolemia.  He is on Rosuvastatin and Vascepta .Recent LDL is 71.     2. DIETER is seen today also for follow up of allergies.  His SX are controlled by Allegra PRN.     3. DIETER is seen also for follow up of Diabetes 2, non insulin requiring.  He is on Jardiance 25 mg.   Recent A1 c is 7.6  had a calcium CT score of 0 in 12/22.    Review of Systems  Denies headache, blurred vision, chest pain, shortness of breath, nausea or vomiting, change in bowel habits or leg pain or swelling.    Objective   /64 (BP Location: Left arm, Patient Position: Sitting, BP Cuff Size: Large adult)   Pulse 70   Temp 36 °C (96.8 °F) (Temporal)   Resp 16   Wt 78.9 kg (174 lb)   SpO2 96%   BMI 26.46 kg/m²     Physical Exam  Vitals and nurse's notes reviewed  General: no acute distress  HEENT: Normal  Neck: Supple  Lungs: Clear  Cardio: RRR w/o murmur  Extremities: No edema, no calf tenderness  Neuro: Alert and oriented with no focal deficits    Assessment/Plan   Problem List Items Addressed This Visit             ICD-10-CM       High    Type 2 diabetes mellitus without complications (Multi) - Primary E11.9     Continue Jardiance.  At metformin  daily.  We discussed monitoring his sugars and I recommended a CGM.  He wants to think about this.  Recheck with me in 6 months at CPE.         Relevant Medications    metFORMIN XR (Glucophage-XR) 500 mg 24 hr tablet    Pure hypercholesterolemia, unspecified E78.00     Continue current medication.  Recheck in 6 months at CPE.            Medium    Erectile dysfunction N52.9    Relevant Medications    sildenafil (Viagra) 100 mg tablet

## 2024-11-15 NOTE — ASSESSMENT & PLAN NOTE
Continue Jardiance.  At metformin  daily.  We discussed monitoring his sugars and I recommended a CGM.  He wants to think about this.  Recheck with me in 6 months at CPE.

## 2024-11-15 NOTE — LETTER
November 15, 2024     Patient: Dieter Wesley   YOB: 1960   Date of Visit: 11/15/2024       To Whom It May Concern:    Dieter Wesley was seen in my clinic on 11/15/2024 at 8:30 am. Please excuse Dieter for his absence from work on this day to make the appointment.    If you have any questions or concerns, please don't hesitate to call.         Sincerely,         Joseph Trimble MD        CC: No Recipients

## 2025-03-10 ENCOUNTER — OFFICE VISIT (OUTPATIENT)
Dept: PRIMARY CARE | Facility: CLINIC | Age: 65
End: 2025-03-10
Payer: COMMERCIAL

## 2025-03-10 VITALS
OXYGEN SATURATION: 95 % | TEMPERATURE: 97.3 F | HEART RATE: 73 BPM | SYSTOLIC BLOOD PRESSURE: 98 MMHG | BODY MASS INDEX: 23.87 KG/M2 | WEIGHT: 157 LBS | RESPIRATION RATE: 18 BRPM | DIASTOLIC BLOOD PRESSURE: 76 MMHG

## 2025-03-10 DIAGNOSIS — J06.9 VIRAL UPPER RESPIRATORY TRACT INFECTION: Primary | ICD-10-CM

## 2025-03-10 DIAGNOSIS — E11.9 TYPE 2 DIABETES MELLITUS WITHOUT COMPLICATION, WITHOUT LONG-TERM CURRENT USE OF INSULIN (MULTI): ICD-10-CM

## 2025-03-10 PROCEDURE — 3074F SYST BP LT 130 MM HG: CPT | Performed by: FAMILY MEDICINE

## 2025-03-10 PROCEDURE — 1158F ADVNC CARE PLAN TLK DOCD: CPT | Performed by: FAMILY MEDICINE

## 2025-03-10 PROCEDURE — 1159F MED LIST DOCD IN RCRD: CPT | Performed by: FAMILY MEDICINE

## 2025-03-10 PROCEDURE — 1036F TOBACCO NON-USER: CPT | Performed by: FAMILY MEDICINE

## 2025-03-10 PROCEDURE — 1126F AMNT PAIN NOTED NONE PRSNT: CPT | Performed by: FAMILY MEDICINE

## 2025-03-10 PROCEDURE — 1123F ACP DISCUSS/DSCN MKR DOCD: CPT | Performed by: FAMILY MEDICINE

## 2025-03-10 PROCEDURE — 3078F DIAST BP <80 MM HG: CPT | Performed by: FAMILY MEDICINE

## 2025-03-10 PROCEDURE — 99214 OFFICE O/P EST MOD 30 MIN: CPT | Performed by: FAMILY MEDICINE

## 2025-03-10 ASSESSMENT — ENCOUNTER SYMPTOMS
WEIGHT LOSS: 1
DEPRESSION: 0
LOSS OF SENSATION IN FEET: 0
COUGH: 1
RHINORRHEA: 1
OCCASIONAL FEELINGS OF UNSTEADINESS: 0

## 2025-03-10 ASSESSMENT — PAIN SCALES - GENERAL: PAINLEVEL_OUTOF10: 0-NO PAIN

## 2025-03-10 NOTE — ASSESSMENT & PLAN NOTE
I suspect his respiratory symptoms were due to viral infection.  He seems to be recovering on his own with a normal exam and him feeling better.  I do not think his weight loss was related to this infection.  He has no other symptoms and his energy level is good so I suspect it was a physiological weight loss may be due to change in diet since starting the metformin.  No red flags for other more concerning causes of weight loss.  At this point we will check blood work as we have not checked his A1c since starting the metformin.  Will also check CMP CBC and a UA.  If these are stable no further testing is necessary.  He is going to continue to monitor his weight.  If he continues to lose weight he will let me know and we will pursue further workup at that time.

## 2025-03-10 NOTE — PROGRESS NOTES
Subjective   Patient ID: Dieter Wesley is a 64 y.o. male who presents for Cough (Patient is here for a cough, nasal drainage x 3 weeks) and Weight Loss (Patient is here for weight loss x 3 weeks).    HPI   He is here for weight loss and URI symptoms.  About 3 weeks ago he developed cough some congestion which was about a week before heading down to [place].  He went down to [place] and is cough persisted but he felt a little bit better.  The past few days after his return here he is felt even better and his energy is back to normal.  He has been concerned about a weight loss.  When he weighed himself after return to [place] he was down about 10 pounds.  In the past 3 to 4 days when he is weight himself he has gained 1 pound.  He has no GI symptoms.  His appetites been good.  According to his chart in this office he is dropped 17 pounds since November for teeth.  Initially had been trying to lose some weight.  He also started metformin in November.  Overall he says his energy level is good  Review of Systems  Denies headache, blurred vision, chest pain, shortness of breath, nausea or vomiting, change in bowel habits or leg pain or swelling.    Objective   BP 98/76 (BP Location: Left arm, Patient Position: Sitting, BP Cuff Size: Large adult)   Pulse 73   Temp 36.3 °C (97.3 °F) (Temporal)   Resp 18   Wt 71.2 kg (157 lb)   SpO2 95%   BMI 23.87 kg/m²     Physical Exam  Vitals and nurse's notes reviewed  General: no acute distress  HEENT: Normal  Neck: Supple  Lungs: Clear  Cardio: RRR w/o murmur  Abdomen: Soft, nontender, no hepatosplenomegaly  Extremities: No edema, no calf tenderness  Neuro: Alert and oriented with no focal deficits    Assessment/Plan   Problem List Items Addressed This Visit             ICD-10-CM       Medium    Viral upper respiratory tract infection - Primary J06.9     I suspect his respiratory symptoms were due to viral infection.  He seems to be recovering on his own with a normal exam and  him feeling better.  I do not think his weight loss was related to this infection.  He has no other symptoms and his energy level is good so I suspect it was a physiological weight loss may be due to change in diet since starting the metformin.  No red flags for other more concerning causes of weight loss.  At this point we will check blood work as we have not checked his A1c since starting the metformin.  Will also check CMP CBC and a UA.  If these are stable no further testing is necessary.  He is going to continue to monitor his weight.  If he continues to lose weight he will let me know and we will pursue further workup at that time.

## 2025-03-13 LAB
ALBUMIN SERPL-MCNC: 3.9 G/DL (ref 3.6–5.1)
ALP SERPL-CCNC: 74 U/L (ref 35–144)
ALT SERPL-CCNC: 21 U/L (ref 9–46)
ANION GAP SERPL CALCULATED.4IONS-SCNC: 8 MMOL/L (CALC) (ref 7–17)
APPEARANCE UR: CLEAR
AST SERPL-CCNC: 19 U/L (ref 10–35)
BASOPHILS # BLD AUTO: 20 CELLS/UL (ref 0–200)
BASOPHILS NFR BLD AUTO: 0.4 %
BILIRUB SERPL-MCNC: 0.4 MG/DL (ref 0.2–1.2)
BILIRUB UR QL STRIP: NEGATIVE
BUN SERPL-MCNC: 19 MG/DL (ref 7–25)
CALCIUM SERPL-MCNC: 8.7 MG/DL (ref 8.6–10.3)
CHLORIDE SERPL-SCNC: 102 MMOL/L (ref 98–110)
CO2 SERPL-SCNC: 27 MMOL/L (ref 20–32)
COLOR UR: YELLOW
CREAT SERPL-MCNC: 0.82 MG/DL (ref 0.7–1.35)
EGFRCR SERPLBLD CKD-EPI 2021: 97 ML/MIN/1.73M2
EOSINOPHIL # BLD AUTO: 90 CELLS/UL (ref 15–500)
EOSINOPHIL NFR BLD AUTO: 1.8 %
ERYTHROCYTE [DISTWIDTH] IN BLOOD BY AUTOMATED COUNT: 13 % (ref 11–15)
EST. AVERAGE GLUCOSE BLD GHB EST-MCNC: 180 MG/DL
EST. AVERAGE GLUCOSE BLD GHB EST-SCNC: 10 MMOL/L
GLUCOSE SERPL-MCNC: 162 MG/DL (ref 65–99)
GLUCOSE UR QL STRIP: ABNORMAL
HBA1C MFR BLD: 7.9 % OF TOTAL HGB
HCT VFR BLD AUTO: 45.7 % (ref 38.5–50)
HGB BLD-MCNC: 14.8 G/DL (ref 13.2–17.1)
HGB UR QL STRIP: NEGATIVE
KETONES UR QL STRIP: ABNORMAL
LEUKOCYTE ESTERASE UR QL STRIP: NEGATIVE
LYMPHOCYTES # BLD AUTO: 1105 CELLS/UL (ref 850–3900)
LYMPHOCYTES NFR BLD AUTO: 22.1 %
MCH RBC QN AUTO: 29.7 PG (ref 27–33)
MCHC RBC AUTO-ENTMCNC: 32.4 G/DL (ref 32–36)
MCV RBC AUTO: 91.6 FL (ref 80–100)
MONOCYTES # BLD AUTO: 675 CELLS/UL (ref 200–950)
MONOCYTES NFR BLD AUTO: 13.5 %
NEUTROPHILS # BLD AUTO: 3110 CELLS/UL (ref 1500–7800)
NEUTROPHILS NFR BLD AUTO: 62.2 %
NITRITE UR QL STRIP: NEGATIVE
PH UR STRIP: ABNORMAL [PH] (ref 5–8)
PLATELET # BLD AUTO: 303 THOUSAND/UL (ref 140–400)
PMV BLD REES-ECKER: 9.2 FL (ref 7.5–12.5)
POTASSIUM SERPL-SCNC: 4 MMOL/L (ref 3.5–5.3)
PROT SERPL-MCNC: 6.7 G/DL (ref 6.1–8.1)
PROT UR QL STRIP: NEGATIVE
RBC # BLD AUTO: 4.99 MILLION/UL (ref 4.2–5.8)
SODIUM SERPL-SCNC: 137 MMOL/L (ref 135–146)
SP GR UR STRIP: 1.04 (ref 1–1.03)
WBC # BLD AUTO: 5 THOUSAND/UL (ref 3.8–10.8)

## 2025-03-23 ASSESSMENT — ENCOUNTER SYMPTOMS
FATIGUE: 0
SEIZURES: 0
POLYDIPSIA: 0
BLACKOUTS: 0
NERVOUS/ANXIOUS: 0
VISUAL CHANGE: 0
WEAKNESS: 0
CONFUSION: 0
DIZZINESS: 0
TREMORS: 0
POLYPHAGIA: 0
HEADACHES: 0
WEIGHT LOSS: 1
BLURRED VISION: 0
SWEATS: 0
SPEECH DIFFICULTY: 0
HUNGER: 0

## 2025-03-27 ENCOUNTER — APPOINTMENT (OUTPATIENT)
Dept: PRIMARY CARE | Facility: CLINIC | Age: 65
End: 2025-03-27
Payer: COMMERCIAL

## 2025-03-27 VITALS
DIASTOLIC BLOOD PRESSURE: 74 MMHG | HEART RATE: 57 BPM | WEIGHT: 159 LBS | OXYGEN SATURATION: 95 % | SYSTOLIC BLOOD PRESSURE: 102 MMHG | RESPIRATION RATE: 18 BRPM | BODY MASS INDEX: 24.18 KG/M2

## 2025-03-27 DIAGNOSIS — E78.00 PURE HYPERCHOLESTEROLEMIA, UNSPECIFIED: Primary | ICD-10-CM

## 2025-03-27 DIAGNOSIS — E11.9 TYPE 2 DIABETES MELLITUS WITHOUT COMPLICATION, WITHOUT LONG-TERM CURRENT USE OF INSULIN: ICD-10-CM

## 2025-03-27 PROCEDURE — 1159F MED LIST DOCD IN RCRD: CPT | Performed by: FAMILY MEDICINE

## 2025-03-27 PROCEDURE — 99213 OFFICE O/P EST LOW 20 MIN: CPT | Performed by: FAMILY MEDICINE

## 2025-03-27 PROCEDURE — 1036F TOBACCO NON-USER: CPT | Performed by: FAMILY MEDICINE

## 2025-03-27 PROCEDURE — 1123F ACP DISCUSS/DSCN MKR DOCD: CPT | Performed by: FAMILY MEDICINE

## 2025-03-27 PROCEDURE — 1126F AMNT PAIN NOTED NONE PRSNT: CPT | Performed by: FAMILY MEDICINE

## 2025-03-27 PROCEDURE — 1158F ADVNC CARE PLAN TLK DOCD: CPT | Performed by: FAMILY MEDICINE

## 2025-03-27 RX ORDER — BLOOD SUGAR DIAGNOSTIC
1 STRIP MISCELLANEOUS 2 TIMES DAILY
Qty: 200 EACH | Refills: 3 | Status: SHIPPED | OUTPATIENT
Start: 2025-03-27

## 2025-03-27 RX ORDER — LANCETS
EACH MISCELLANEOUS
Qty: 200 EACH | Refills: 3 | Status: SHIPPED | OUTPATIENT
Start: 2025-03-27

## 2025-03-27 RX ORDER — DEXTROSE 4 G
TABLET,CHEWABLE ORAL
Qty: 1 EACH | Refills: 1 | Status: SHIPPED | OUTPATIENT
Start: 2025-03-27

## 2025-03-27 ASSESSMENT — PAIN SCALES - GENERAL: PAINLEVEL_OUTOF10: 0-NO PAIN

## 2025-03-27 ASSESSMENT — PATIENT HEALTH QUESTIONNAIRE - PHQ9
SUM OF ALL RESPONSES TO PHQ9 QUESTIONS 1 AND 2: 0
1. LITTLE INTEREST OR PLEASURE IN DOING THINGS: NOT AT ALL
2. FEELING DOWN, DEPRESSED OR HOPELESS: NOT AT ALL

## 2025-03-27 ASSESSMENT — ENCOUNTER SYMPTOMS
LOSS OF SENSATION IN FEET: 0
DEPRESSION: 0
OCCASIONAL FEELINGS OF UNSTEADINESS: 0

## 2025-03-27 NOTE — PROGRESS NOTES
Subjective   Patient ID: Dieter Wesley is a 64 y.o. male who presents for Follow-up (Patient is here for a follow up on lab work ).    HPI   1. DIETER is seen today  for follow up of Hypercholesterolemia.  He is on Rosuvastatin and Vascepta .     2. DIETER is seen also for follow up of Diabetes 2, non insulin requiring.  He is on Jardiance 25 mg and metformin  mg (started 4 months ago).  Recent A1c is 7.9.  He states he is eating a fairly low-carb diet.     Review of Systems  Denies headache, blurred vision, chest pain, shortness of breath, nausea or vomiting, change in bowel habits or leg pain or swelling.    Objective   /74 (BP Location: Left arm, Patient Position: Sitting, BP Cuff Size: Large adult)   Pulse 57   Resp 18   Wt 72.1 kg (159 lb)   SpO2 95%   BMI 24.18 kg/m²     Physical Exam  Vitals and nurse's notes reviewed  General: no acute distress  HEENT: Normal  Neck: Supple  Lungs: Clear  Cardio: RRR w/o murmur  Extremities: No edema, no calf tenderness  Neuro: Alert and oriented with no focal deficits    Assessment/Plan   Assessment & Plan  Pure hypercholesterolemia, unspecified  Continue rosuvastatin and Vascepa.  Recheck in 3 months as scheduled.         Type 2 diabetes mellitus without complication, without long-term current use of insulin (Multi)  We long discussion regarding his elevated A1c.  At this point we will keep his medications same but he is can start monitoring his sugars.  He prefers a glucometer over CGM.  Will send the prescription for glucometer and he is going to check it once or twice a day fasting the morning and an hour and a half after one of his meals.  He is to return here as scheduled in 3 months at which time we will recheck an A1c.

## 2025-03-27 NOTE — ASSESSMENT & PLAN NOTE
We long discussion regarding his elevated A1c.  At this point we will keep his medications same but he is can start monitoring his sugars.  He prefers a glucometer over CGM.  Will send the prescription for glucometer and he is going to check it once or twice a day fasting the morning and an hour and a half after one of his meals.  He is to return here as scheduled in 3 months at which time we will recheck an A1c.

## 2025-03-29 DIAGNOSIS — E78.00 PURE HYPERCHOLESTEROLEMIA, UNSPECIFIED: ICD-10-CM

## 2025-03-31 RX ORDER — ROSUVASTATIN CALCIUM 20 MG/1
20 TABLET, COATED ORAL DAILY
Qty: 90 TABLET | Refills: 3 | Status: SHIPPED | OUTPATIENT
Start: 2025-03-31

## 2025-04-02 ENCOUNTER — TELEPHONE (OUTPATIENT)
Dept: PRIMARY CARE | Facility: CLINIC | Age: 65
End: 2025-04-02
Payer: COMMERCIAL

## 2025-04-02 ASSESSMENT — ENCOUNTER SYMPTOMS
VOMITING: 1
ANOREXIA: 1
ABDOMINAL PAIN: 1
DIARRHEA: 1
WEIGHT LOSS: 1
FEVER: 1

## 2025-04-02 NOTE — TELEPHONE ENCOUNTER
Marcio's [facility] [place] call to me to schedule an appt for ongoing ABD/back pain has seen MARCIO  for this before he has a CPE 6/13 nothing available until then did not want to wait. Said this is the 4th time this has happened.  ok for a acute spot , told him MARCIO is out

## 2025-04-04 ENCOUNTER — OFFICE VISIT (OUTPATIENT)
Dept: PRIMARY CARE | Facility: CLINIC | Age: 65
End: 2025-04-04
Payer: COMMERCIAL

## 2025-04-04 ENCOUNTER — TELEPHONE (OUTPATIENT)
Dept: PRIMARY CARE | Facility: CLINIC | Age: 65
End: 2025-04-04

## 2025-04-04 VITALS
OXYGEN SATURATION: 99 % | TEMPERATURE: 97.4 F | SYSTOLIC BLOOD PRESSURE: 110 MMHG | HEART RATE: 67 BPM | WEIGHT: 157 LBS | BODY MASS INDEX: 23.87 KG/M2 | DIASTOLIC BLOOD PRESSURE: 60 MMHG | RESPIRATION RATE: 18 BRPM

## 2025-04-04 DIAGNOSIS — R10.30 LOWER ABDOMINAL PAIN: Primary | ICD-10-CM

## 2025-04-04 PROCEDURE — 3078F DIAST BP <80 MM HG: CPT | Performed by: FAMILY MEDICINE

## 2025-04-04 PROCEDURE — 1125F AMNT PAIN NOTED PAIN PRSNT: CPT | Performed by: FAMILY MEDICINE

## 2025-04-04 PROCEDURE — 3074F SYST BP LT 130 MM HG: CPT | Performed by: FAMILY MEDICINE

## 2025-04-04 PROCEDURE — 1036F TOBACCO NON-USER: CPT | Performed by: FAMILY MEDICINE

## 2025-04-04 PROCEDURE — 1123F ACP DISCUSS/DSCN MKR DOCD: CPT | Performed by: FAMILY MEDICINE

## 2025-04-04 PROCEDURE — 1159F MED LIST DOCD IN RCRD: CPT | Performed by: FAMILY MEDICINE

## 2025-04-04 PROCEDURE — 99214 OFFICE O/P EST MOD 30 MIN: CPT | Performed by: FAMILY MEDICINE

## 2025-04-04 ASSESSMENT — PATIENT HEALTH QUESTIONNAIRE - PHQ9
2. FEELING DOWN, DEPRESSED OR HOPELESS: NOT AT ALL
1. LITTLE INTEREST OR PLEASURE IN DOING THINGS: NOT AT ALL
SUM OF ALL RESPONSES TO PHQ9 QUESTIONS 1 AND 2: 0

## 2025-04-04 ASSESSMENT — PAIN SCALES - GENERAL: PAINLEVEL_OUTOF10: 2

## 2025-04-04 NOTE — TELEPHONE ENCOUNTER
Pt called [phone]  he was seen today and given a CT scan order,  he called to schedule and they are requesting him to have labs done. Creatine and GFR, told him Dr Trimble is out this pm

## 2025-04-04 NOTE — PROGRESS NOTES
Subjective   Patient ID: Dieter Wesley is a 64 y.o. male who presents for Follow-up (Patient is here for a follow up for ongoing abd/back pain, patient is not doing any better.).    HPI   He is here for bilateral lower abdominal pain intermittent abdominal distention and diarrhea.  The symptoms have been present starting about a month ago after returning from a trip to [place].  Initially he had a couple episodes where he had some abdominal discomfort and he felt constipated these resolved.  Over the past week he has noticed increasing symptoms and on 2 occasions had severe abdominal distention to the point where he was went to the emergency room.  The last 1 occurred 3 nights ago.  Ever since his last episode has been having diarrhea 3-4 times a day.  Normally has 1 bowel movement every morning.  He had 1 episode of low-grade fever.  He said no vomiting.  He has not been in contact with ambulance with similar symptoms.  No bills on his trip had similar symptoms.  He had a colonoscopy 2 and half years ago which was normal.  He has had no change in urination.  Review of Systems  Denies headache, blurred vision, chest pain, shortness of breath,  or leg pain or swelling.    Objective   /60 (BP Location: Left arm, Patient Position: Sitting, BP Cuff Size: Large adult)   Pulse 67   Temp 36.3 °C (97.4 °F) (Temporal)   Resp 18   Wt 71.2 kg (157 lb)   SpO2 99%   BMI 23.87 kg/m²     Physical Exam  Vitals and nurse's notes reviewed  General: no acute distress  HEENT: Normal  Neck: Supple  Lungs: Clear  Cardio: RRR w/o murmur  Abdomen: Soft, nontender, no hepatosplenomegaly  Extremities: No edema, no calf tenderness  Neuro: Alert and oriented with no focal deficits    Assessment/Plan   Assessment & Plan  Lower abdominal pain  History and exam do not lead to any specific obvious diagnosis.  Possibly could be diverticulitis or enteritis.  Does not sound like typical gallbladder type symptoms.  Could be infectious  but diarrhea only started a few days ago.  Will get abdominal CT scan and notify him of results.  If he has any further severe flareups he should go to the emergency room.  I will notify him of results.  If negative and symptoms persist consider GI referral.    Orders:    CT abdomen pelvis w IV contrast; Future

## 2025-04-04 NOTE — ASSESSMENT & PLAN NOTE
History and exam do not lead to any specific obvious diagnosis.  Possibly could be diverticulitis or enteritis.  Does not sound like typical gallbladder type symptoms.  Could be infectious but diarrhea only started a few days ago.  Will get abdominal CT scan and notify him of results.  If he has any further severe flareups he should go to the emergency room.  I will notify him of results.  If negative and symptoms persist consider GI referral.    Orders:    CT abdomen pelvis w IV contrast; Future

## 2025-04-09 LAB
ANION GAP SERPL CALCULATED.4IONS-SCNC: 10 MMOL/L (CALC) (ref 7–17)
BUN SERPL-MCNC: 15 MG/DL (ref 7–25)
BUN/CREAT SERPL: ABNORMAL (CALC) (ref 6–22)
CALCIUM SERPL-MCNC: 9.1 MG/DL (ref 8.6–10.3)
CHLORIDE SERPL-SCNC: 102 MMOL/L (ref 98–110)
CO2 SERPL-SCNC: 25 MMOL/L (ref 20–32)
CREAT SERPL-MCNC: 0.81 MG/DL (ref 0.7–1.35)
EGFRCR SERPLBLD CKD-EPI 2021: 97 ML/MIN/1.73M2
GLUCOSE SERPL-MCNC: 138 MG/DL (ref 65–99)
POTASSIUM SERPL-SCNC: 4.6 MMOL/L (ref 3.5–5.3)
SODIUM SERPL-SCNC: 137 MMOL/L (ref 135–146)

## 2025-04-14 ENCOUNTER — HOSPITAL ENCOUNTER (OUTPATIENT)
Dept: RADIOLOGY | Facility: HOSPITAL | Age: 65
Discharge: HOME | End: 2025-04-14
Payer: COMMERCIAL

## 2025-04-14 DIAGNOSIS — R10.30 LOWER ABDOMINAL PAIN: ICD-10-CM

## 2025-04-14 PROCEDURE — 74177 CT ABD & PELVIS W/CONTRAST: CPT

## 2025-04-14 PROCEDURE — 2550000001 HC RX 255 CONTRASTS: Performed by: FAMILY MEDICINE

## 2025-04-14 RX ADMIN — IOHEXOL 75 ML: 350 INJECTION, SOLUTION INTRAVENOUS at 15:08

## 2025-05-08 DIAGNOSIS — E11.9 TYPE 2 DIABETES MELLITUS WITHOUT COMPLICATION, WITHOUT LONG-TERM CURRENT USE OF INSULIN: ICD-10-CM

## 2025-05-08 RX ORDER — EMPAGLIFLOZIN 25 MG/1
25 TABLET, FILM COATED ORAL
Qty: 90 TABLET | Refills: 3 | Status: SHIPPED | OUTPATIENT
Start: 2025-05-08

## 2025-06-10 ASSESSMENT — PROMIS GLOBAL HEALTH SCALE
EMOTIONAL_PROBLEMS: NEVER
RATE_GENERAL_HEALTH: VERY GOOD
RATE_QUALITY_OF_LIFE: VERY GOOD
RATE_PHYSICAL_HEALTH: VERY GOOD
CARRYOUT_SOCIAL_ACTIVITIES: VERY GOOD
RATE_MENTAL_HEALTH: EXCELLENT
RATE_SOCIAL_SATISFACTION: EXCELLENT
CARRYOUT_PHYSICAL_ACTIVITIES: MOSTLY
RATE_AVERAGE_PAIN: 4
RATE_AVERAGE_FATIGUE: MILD

## 2025-06-11 DIAGNOSIS — G58.9 PINCHED NERVE IN NECK: Primary | ICD-10-CM

## 2025-06-11 LAB — PSA SERPL-MCNC: 1.8 NG/ML

## 2025-06-11 RX ORDER — PREDNISONE 20 MG/1
40 TABLET ORAL DAILY
Qty: 10 TABLET | Refills: 0 | Status: SHIPPED | OUTPATIENT
Start: 2025-06-11 | End: 2025-06-16

## 2025-06-12 LAB
ALBUMIN SERPL-MCNC: 4.3 G/DL (ref 3.6–5.1)
ALBUMIN/CREAT UR: 2 MG/G CREAT
ALP SERPL-CCNC: 84 U/L (ref 35–144)
ALT SERPL-CCNC: 32 U/L (ref 9–46)
ANION GAP SERPL CALCULATED.4IONS-SCNC: 11 MMOL/L (CALC) (ref 7–17)
APPEARANCE UR: CLEAR
AST SERPL-CCNC: 29 U/L (ref 10–35)
BACTERIA #/AREA URNS HPF: ABNORMAL /HPF
BASOPHILS # BLD AUTO: 42 CELLS/UL (ref 0–200)
BASOPHILS NFR BLD AUTO: 0.7 %
BILIRUB SERPL-MCNC: 0.5 MG/DL (ref 0.2–1.2)
BILIRUB UR QL STRIP: NEGATIVE
BUN SERPL-MCNC: 23 MG/DL (ref 7–25)
CALCIUM SERPL-MCNC: 9.9 MG/DL (ref 8.6–10.3)
CHLORIDE SERPL-SCNC: 101 MMOL/L (ref 98–110)
CHOLEST SERPL-MCNC: 154 MG/DL
CHOLEST/HDLC SERPL: 3.9 (CALC)
CO2 SERPL-SCNC: 25 MMOL/L (ref 20–32)
COLOR UR: YELLOW
CREAT SERPL-MCNC: 1.02 MG/DL (ref 0.7–1.35)
CREAT UR-MCNC: 82 MG/DL (ref 20–320)
EGFRCR SERPLBLD CKD-EPI 2021: 81 ML/MIN/1.73M2
EOSINOPHIL # BLD AUTO: 150 CELLS/UL (ref 15–500)
EOSINOPHIL NFR BLD AUTO: 2.5 %
ERYTHROCYTE [DISTWIDTH] IN BLOOD BY AUTOMATED COUNT: 13.1 % (ref 11–15)
EST. AVERAGE GLUCOSE BLD GHB EST-MCNC: 160 MG/DL
EST. AVERAGE GLUCOSE BLD GHB EST-SCNC: 8.9 MMOL/L
GLUCOSE SERPL-MCNC: 146 MG/DL (ref 65–99)
GLUCOSE UR QL STRIP: ABNORMAL
HBA1C MFR BLD: 7.2 %
HCT VFR BLD AUTO: 51.4 % (ref 38.5–50)
HDLC SERPL-MCNC: 39 MG/DL
HGB BLD-MCNC: 16.6 G/DL (ref 13.2–17.1)
HGB UR QL STRIP: NEGATIVE
HYALINE CASTS #/AREA URNS LPF: ABNORMAL /LPF
KETONES UR QL STRIP: ABNORMAL
LDLC SERPL CALC-MCNC: ABNORMAL MG/DL
LEUKOCYTE ESTERASE UR QL STRIP: NEGATIVE
LYMPHOCYTES # BLD AUTO: 1260 CELLS/UL (ref 850–3900)
LYMPHOCYTES NFR BLD AUTO: 21 %
MCH RBC QN AUTO: 29.6 PG (ref 27–33)
MCHC RBC AUTO-ENTMCNC: 32.3 G/DL (ref 32–36)
MCV RBC AUTO: 91.6 FL (ref 80–100)
MICROALBUMIN UR-MCNC: 0.2 MG/DL
MONOCYTES # BLD AUTO: 624 CELLS/UL (ref 200–950)
MONOCYTES NFR BLD AUTO: 10.4 %
NEUTROPHILS # BLD AUTO: 3924 CELLS/UL (ref 1500–7800)
NEUTROPHILS NFR BLD AUTO: 65.4 %
NITRITE UR QL STRIP: NEGATIVE
NONHDLC SERPL-MCNC: 115 MG/DL (CALC)
PH UR STRIP: 5.5 [PH] (ref 5–8)
PLATELET # BLD AUTO: 207 THOUSAND/UL (ref 140–400)
PMV BLD REES-ECKER: 10.2 FL (ref 7.5–12.5)
POTASSIUM SERPL-SCNC: 4 MMOL/L (ref 3.5–5.3)
PROT SERPL-MCNC: 6.8 G/DL (ref 6.1–8.1)
PROT UR QL STRIP: NEGATIVE
RBC # BLD AUTO: 5.61 MILLION/UL (ref 4.2–5.8)
RBC #/AREA URNS HPF: ABNORMAL /HPF
SODIUM SERPL-SCNC: 137 MMOL/L (ref 135–146)
SP GR UR STRIP: 1.04 (ref 1–1.03)
SQUAMOUS #/AREA URNS HPF: ABNORMAL /HPF
TRIGL SERPL-MCNC: 570 MG/DL
WBC # BLD AUTO: 6 THOUSAND/UL (ref 3.8–10.8)
WBC #/AREA URNS HPF: ABNORMAL /HPF

## 2025-06-13 ENCOUNTER — APPOINTMENT (OUTPATIENT)
Dept: PRIMARY CARE | Facility: CLINIC | Age: 65
End: 2025-06-13
Payer: COMMERCIAL

## 2025-06-17 ENCOUNTER — APPOINTMENT (OUTPATIENT)
Dept: PRIMARY CARE | Facility: CLINIC | Age: 65
End: 2025-06-17
Payer: COMMERCIAL

## 2025-06-17 ENCOUNTER — TELEPHONE (OUTPATIENT)
Dept: PRIMARY CARE | Facility: CLINIC | Age: 65
End: 2025-06-17

## 2025-06-17 VITALS
SYSTOLIC BLOOD PRESSURE: 116 MMHG | BODY MASS INDEX: 24.25 KG/M2 | DIASTOLIC BLOOD PRESSURE: 62 MMHG | OXYGEN SATURATION: 95 % | WEIGHT: 160 LBS | HEIGHT: 68 IN | RESPIRATION RATE: 18 BRPM | HEART RATE: 62 BPM

## 2025-06-17 DIAGNOSIS — Z00.00 WELL ADULT EXAM: Primary | ICD-10-CM

## 2025-06-17 DIAGNOSIS — E78.00 PURE HYPERCHOLESTEROLEMIA, UNSPECIFIED: ICD-10-CM

## 2025-06-17 DIAGNOSIS — E11.9 TYPE 2 DIABETES MELLITUS WITHOUT COMPLICATION, WITHOUT LONG-TERM CURRENT USE OF INSULIN: ICD-10-CM

## 2025-06-17 DIAGNOSIS — Z12.5 SCREENING FOR PROSTATE CANCER: ICD-10-CM

## 2025-06-17 DIAGNOSIS — N52.9 ERECTILE DYSFUNCTION, UNSPECIFIED ERECTILE DYSFUNCTION TYPE: ICD-10-CM

## 2025-06-17 DIAGNOSIS — G58.9 PINCHED NERVE IN NECK: ICD-10-CM

## 2025-06-17 PROCEDURE — 99214 OFFICE O/P EST MOD 30 MIN: CPT

## 2025-06-17 PROCEDURE — 3008F BODY MASS INDEX DOCD: CPT

## 2025-06-17 PROCEDURE — 1125F AMNT PAIN NOTED PAIN PRSNT: CPT

## 2025-06-17 PROCEDURE — 90471 IMMUNIZATION ADMIN: CPT

## 2025-06-17 PROCEDURE — 3074F SYST BP LT 130 MM HG: CPT

## 2025-06-17 PROCEDURE — 1159F MED LIST DOCD IN RCRD: CPT

## 2025-06-17 PROCEDURE — 93000 ELECTROCARDIOGRAM COMPLETE: CPT

## 2025-06-17 PROCEDURE — 3078F DIAST BP <80 MM HG: CPT

## 2025-06-17 PROCEDURE — 99397 PER PM REEVAL EST PAT 65+ YR: CPT

## 2025-06-17 PROCEDURE — 90715 TDAP VACCINE 7 YRS/> IM: CPT

## 2025-06-17 PROCEDURE — 1036F TOBACCO NON-USER: CPT

## 2025-06-17 RX ORDER — SILDENAFIL 100 MG/1
100 TABLET, FILM COATED ORAL AS NEEDED
Qty: 10 TABLET | Refills: 0 | Status: SHIPPED | OUTPATIENT
Start: 2025-06-17

## 2025-06-17 RX ORDER — METFORMIN HYDROCHLORIDE 500 MG/1
500 TABLET, EXTENDED RELEASE ORAL
Qty: 180 TABLET | Refills: 0 | Status: SHIPPED | OUTPATIENT
Start: 2025-06-17 | End: 2025-09-15

## 2025-06-17 ASSESSMENT — ENCOUNTER SYMPTOMS
SORE THROAT: 0
MYALGIAS: 0
NUMBNESS: 0
ARTHRALGIAS: 0
DEPRESSION: 0
LOSS OF SENSATION IN FEET: 0
FEVER: 0
DYSPHORIC MOOD: 0
NERVOUS/ANXIOUS: 0
ABDOMINAL PAIN: 0
UNEXPECTED WEIGHT CHANGE: 0
HEMATURIA: 0
SHORTNESS OF BREATH: 0
TROUBLE SWALLOWING: 0
WEAKNESS: 0
OCCASIONAL FEELINGS OF UNSTEADINESS: 0
COUGH: 0
VOMITING: 0
BLOOD IN STOOL: 0
NAUSEA: 0
CHILLS: 0
DIFFICULTY URINATING: 0

## 2025-06-17 ASSESSMENT — PAIN SCALES - GENERAL: PAINLEVEL_OUTOF10: 5

## 2025-06-17 NOTE — ASSESSMENT & PLAN NOTE
Chronic. Continue Crestor 20 mg daily, Vascepa 2g BID. Decrease fast food, fried food, processed foods and large amounts of red meat. Increase lean protein, vegetables and exercise. Recheck in 6 months with PCP.

## 2025-06-17 NOTE — ASSESSMENT & PLAN NOTE
Chronic, needs better control.  Increase metformin  mg to twice daily dosing.  Continue Jardiance 25 mg daily. Low carbohydrate diet and increase in activity. Recheck in 3 months with PCP.     Orders:    metFORMIN XR (Glucophage-XR) 500 mg 24 hr tablet; Take 1 tablet (500 mg) by mouth 2 times daily (morning and late afternoon). Do not crush, chew, or split.

## 2025-06-17 NOTE — ASSESSMENT & PLAN NOTE
Chronic, needs better control.  Patient states prednisone did not improve symptoms.  Has never tried physical therapy in the past, agreeable to try at this time.  Referral placed.  Continue OTC Tylenol/NSAIDs as directed for pain, warm compresses, stretching.  Follow-up with PCP should symptoms worsen.    Orders:    Referral to Physical Therapy; Future

## 2025-06-17 NOTE — PROGRESS NOTES
Subjective   Patient ID: Dieter Wesley is a 64 y.o. male who presents for Annual Exam (Patient is here for his annual wellness exam ).    HPI   Dieter Wesley is seen for his comprehensive physical exam. PMH, SH, FH, medications were reviewed and updated.     CHRONIC ISSUES:   DM: On metformin  mg daily, Jardiance 25 mg daily. . Eye exam UTD. Foot exam today. Denies chest pain, SOB, dizziness, lightheadedness, nausea, vomiting, hypoglycemic episodes.   Lab Results   Component Value Date    HGBA1C 7.2 (H) 06/11/2025    HGBA1C 7.9 (H) 03/12/2025    HGBA1C 7.6 (H) 11/05/2024     Lab Results   Component Value Date    CREATININE 1.02 06/11/2025    GLUCOSE 146 (H) 06/11/2025    EGFR 81 06/11/2025     BMI Readings from Last 6 Encounters:   06/17/25 24.33 kg/m²   04/04/25 23.87 kg/m²   03/27/25 24.18 kg/m²   03/10/25 23.87 kg/m²   11/15/24 26.46 kg/m²   05/14/24 25.85 kg/m²     ALBUMIN/CREATININE RATIO, RANDOM URINE   Date Value Ref Range Status   06/11/2025 2 <30 mg/g creat Final     Comment:        The ADA defines abnormalities in albumin  excretion as follows:     Albuminuria Category        Result (mg/g creatinine)     Normal to Mildly increased   <30  Moderately increased            Severely increased           > OR = 300     The ADA recommends that at least two of three  specimens collected within a 3-6 month period be  abnormal before considering a patient to be  within a diagnostic category.       Albumin/Creatinine Ratio   Date Value Ref Range Status   11/02/2023 6.7 <30.0 ug/mg Creat Final     Albumin/Creatine Ratio   Date Value Ref Range Status   05/03/2023 52.9 (H) 0 - 30 MG/G Final     Comment:     30 to 300 mg/g indicates an increased risk for diabetic nephropathy.   Greater than 300 mg/g is consistent with clinical nephropathy. (Am J   Kidney Disease 1995, 25:107)       HLD: On Crestor 20 mg daily, Vascepa 2g BID. Tolerating well. Denies chest pain, SOB, nausea, vomiting, myalgias.   Lab  Results   Component Value Date    TRIG 570 (H) 06/11/2025    CHOL 154 06/11/2025    LDLCALC  06/11/2025      Comment:         LDL cholesterol not calculated. Triglyceride levels  greater than 400 mg/dL invalidate calculated LDL results.           Reference range: <100     Desirable range <100 mg/dL for primary prevention;    <70 mg/dL for patients with CHD or diabetic patients   with > or = 2 CHD risk factors.     LDL-C is now calculated using the Josh   calculation, which is a validated novel method providing   better accuracy than the Friedewald equation in the   estimation of LDL-C.   Jalil GROSS et al. SARAH. 2013;310(19): 8313-9109   (http://education.[employer].Atieva/faq/YUG563)      HDL 39 (L) 06/11/2025     The 10-year ASCVD risk score (Billy ACEVEDO, et al., 2019) is: 20.6%    Values used to calculate the score:      Age: 64 years      Sex: Male      Is Non- : No      Diabetic: Yes      Tobacco smoker: No      Systolic Blood Pressure: 116 mmHg      Is BP treated: No      HDL Cholesterol: 39 mg/dL      Total Cholesterol: 154 mg/dL    IMMUNIZATIONS:   Immunization History   Administered Date(s) Administered    Pfizer Purple Cap SARS-CoV-2 04/07/2021, 05/07/2021    Tdap vaccine, age 7 year and older (BOOSTRIX, ADACEL) 03/04/2015    Zoster vaccine, recombinant, adult (SHINGRIX) 04/13/2022, 08/11/2022   Influenza: Declines  Tdap: Due   Prevnar 20: Declines     LUNG CANCER SCREENING (50-77 y.o. with 20+ pack year hx & current smoker or quit <15yrs ago)  History - Tobacco Use - Hide Smokeless[1]    COLORECTAL SCREENING (From 45 or 10yrs younger than family diagnosis)  Last colonoscopy - 10/22  Next colonoscopy due -2032  Relevant FHx - None    PROSTATE CANCER SCREENING (From 48 y.o. until 68 y.o.)  Last PSA - 06/25  Next PSA due - 06/26  Relevant FHx - None    UTD on eye exam     Review of Systems   Constitutional:  Negative for chills, fever and unexpected weight change.   HENT:   "Negative for congestion, hearing loss, postnasal drip, sore throat and trouble swallowing.    Eyes:  Negative for visual disturbance.   Respiratory:  Negative for cough and shortness of breath.    Cardiovascular:  Negative for chest pain and leg swelling.   Gastrointestinal:  Negative for abdominal pain, blood in stool, nausea and vomiting.   Genitourinary:  Negative for difficulty urinating and hematuria.   Musculoskeletal:  Negative for arthralgias and myalgias.   Skin:  Negative for rash.   Neurological:  Negative for weakness and numbness.   Psychiatric/Behavioral:  Negative for dysphoric mood. The patient is not nervous/anxious.    All other systems reviewed and are negative.    Objective   /62 (BP Location: Left arm, Patient Position: Sitting, BP Cuff Size: Large adult)   Pulse 62   Resp 18   Ht 1.727 m (5' 8\")   Wt 72.6 kg (160 lb)   SpO2 95%   BMI 24.33 kg/m²     Physical Exam  Vitals and nursing note reviewed.   Constitutional:       Appearance: Normal appearance.   HENT:      Head: Normocephalic.      Right Ear: Tympanic membrane and ear canal normal.      Left Ear: Tympanic membrane and ear canal normal.      Nose: Nose normal.      Mouth/Throat:      Mouth: Mucous membranes are moist.   Eyes:      Extraocular Movements: Extraocular movements intact.      Conjunctiva/sclera: Conjunctivae normal.      Pupils: Pupils are equal, round, and reactive to light.   Neck:      Vascular: No carotid bruit.   Cardiovascular:      Rate and Rhythm: Normal rate and regular rhythm.      Pulses:           Dorsalis pedis pulses are 2+ on the right side and 2+ on the left side.   Pulmonary:      Effort: Pulmonary effort is normal.      Breath sounds: Normal breath sounds.   Abdominal:      General: Abdomen is flat. Bowel sounds are normal.      Palpations: Abdomen is soft.      Tenderness: There is no abdominal tenderness.      Hernia: There is no hernia in the left inguinal area or right inguinal area. "   Genitourinary:     Penis: Normal.       Testes: Normal.      Comments: Declined chaperone  Musculoskeletal:         General: Normal range of motion.      Cervical back: Normal range of motion and neck supple.      Right lower leg: No edema.      Left lower leg: No edema.   Lymphadenopathy:      Cervical: No cervical adenopathy.   Skin:     General: Skin is warm.   Neurological:      General: No focal deficit present.      Mental Status: He is alert.   Psychiatric:         Mood and Affect: Mood normal.       Assessment/Plan   Assessment & Plan  Well adult exam  Preventative measures discussed. Labs reviewed with patient. Immunizations discussed, Tdap administered today.     Orders:    ECG 12 lead (Clinic Performed)    Type 2 diabetes mellitus without complication, without long-term current use of insulin  Chronic, needs better control.  Increase metformin  mg to twice daily dosing.  Continue Jardiance 25 mg daily. Low carbohydrate diet and increase in activity. Recheck in 3 months with PCP.     Orders:    metFORMIN XR (Glucophage-XR) 500 mg 24 hr tablet; Take 1 tablet (500 mg) by mouth 2 times daily (morning and late afternoon). Do not crush, chew, or split.    Pure hypercholesterolemia, unspecified  Chronic. Continue Crestor 20 mg daily, Vascepa 2g BID. Decrease fast food, fried food, processed foods and large amounts of red meat. Increase lean protein, vegetables and exercise. Recheck in 6 months with PCP.          Erectile dysfunction, unspecified erectile dysfunction type    Orders:    sildenafil (Viagra) 100 mg tablet; Take 1 tablet (100 mg) by mouth if needed for erectile dysfunction.    Pinched nerve in neck  Chronic, needs better control.  Patient states prednisone did not improve symptoms.  Has never tried physical therapy in the past, agreeable to try at this time.  Referral placed.  Continue OTC Tylenol/NSAIDs as directed for pain, warm compresses, stretching.  Follow-up with PCP should symptoms  worsen.    Orders:    Referral to Physical Therapy; Future                [1]   Social History  Tobacco Use   Smoking Status Never    Passive exposure: Never   Smokeless Tobacco Never

## 2025-06-17 NOTE — ASSESSMENT & PLAN NOTE
Preventative measures discussed. Labs reviewed with patient. Immunizations discussed, Tdap administered today.     Orders:    ECG 12 lead (Clinic Performed)

## 2025-06-27 ENCOUNTER — EVALUATION (OUTPATIENT)
Dept: PHYSICAL THERAPY | Facility: CLINIC | Age: 65
End: 2025-06-27
Payer: COMMERCIAL

## 2025-06-27 DIAGNOSIS — G58.9 PINCHED NERVE IN NECK: ICD-10-CM

## 2025-06-27 DIAGNOSIS — R29.898 WEAKNESS OF HAND: Primary | ICD-10-CM

## 2025-06-27 PROCEDURE — 97162 PT EVAL MOD COMPLEX 30 MIN: CPT | Mod: GP

## 2025-06-27 PROCEDURE — 97110 THERAPEUTIC EXERCISES: CPT | Mod: GP

## 2025-06-27 ASSESSMENT — PAIN - FUNCTIONAL ASSESSMENT: PAIN_FUNCTIONAL_ASSESSMENT: 0-10

## 2025-06-27 ASSESSMENT — PAIN SCALES - GENERAL: PAINLEVEL_OUTOF10: 3

## 2025-06-27 NOTE — Clinical Note
June 27, 2025    John Yen, PT  [address] [facility] [address]  [place] [address]    Patient: Dieter Wesley   YOB: 1960   Date of Visit: 6/27/2025       Dear Alea Alejo PA-C  [address] [place] [address]  [facility] [address]  [place] [address]    The attached plan of care is being sent to you because your patient’s medical reimbursement requires that you certify the plan of care. Your signature is required to allow uninterrupted insurance coverage.      You may indicate your approval by signing below and faxing this form back to us at Dept Fax: [phone].    Please call Dept: [phone] with any questions or concerns.    Thank you for this referral,        John Yen PT  [facility] [address] [facility]  [address] [facility] [address]    Payer: Payor: MEDICAL [facility] / Plan: MEDICAL [employer] / Product Type: *No Product type* /                                                                         Date:     Dear John Yen PT,     Re: Mr. Dieter Wesley, MRN:[id number]    I certify that I have reviewed the attached plan of care and it is medically necessary for Mr. Dieter Wesley (1960) who is under my care.          ______________________________________                    _________________  Provider name and credentials                                           Date and time                                                                                           Plan of Care 6/27/25   Effective from: 6/27/2025  Effective to: 9/25/2025    Plan ID: [id number]            Participants as of Finalize on 6/27/2025    Name Type Comments Contact Info    Alea Alejo PA-C Referring Provider  [phone]    John Yen PT Physical Therapist  [phone]       Last Plan Note     Author: John Yen PT Status: Sign when Signing Visit Last edited: 6/27/2025  1:15 PM           Physical Therapy  Physical Therapy Evaluation    Patient Name: Dieter  Franc Wesley  MRN: [id number]  Today's Date: 6/27/2025  Time Calculation  Start Time: 1316  Stop Time: 1404  Time Calculation (min): 48 min    Insurance:  Visit number: 1  Insurance Type: requires no auth    General  Reason for visit: General  Reason for Referral: cervical radiculaopathy  Referred By: Sapna  General Comment: pt states he has nerve pain to his finger tips, he had an episode a few years ago and it mostly went away, no its back and he has dexitrity problems, can't golf, type on a keyboard    Current Problem  1. Weakness of hand  Follow Up In Physical Therapy      2. Pinched nerve in neck  Referral to Physical Therapy          Assessment:    Pt with radicular pain and numbness into the hand (mostly numbness) and between the shoulder blades, he displays decreased  strength and mild deficits in cervical AROM.  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  His cervical radiculopathy was reproduced with repeated extension and dissipated once ceased with the pain mostly periscapular.  His numbness in the hand was only improved with median nerve flossing techniques. He would benefit from skilled therapy to improve his symptoms and improve dexterity and strength in his hand      Plan:   Treatment/Interventions: Blood flow restriction therapy, Cryotherapy, Dry needling, Education/ Instruction, Electrical stimulation, Hot pack, Manual therapy, Neuromuscular re-education, Self care/ home management, Taping techniques, Therapeutic activities, Therapeutic exercises  PT Plan: Skilled PT  PT Frequency: 1 time per week  Duration: x12 weeks  Onset Date: 06/17/25    Precautions:   Precautions  STEADI Fall Risk Score (The score of 4 or more indicates an increased risk of falling): 0  Precautions Comment: none    Medical History Form: Reviewed (scanned into chart)    Subjective:   Onset Date: 06/17/25  NISA: Insidious     Current Condition since injury:   same     Social  Determinants of health: No    PAIN  Pain Assessment: 0-10  0-10 (Numeric) Pain Score: 3  Pain Type: Neuropathic pain  Pain Location: Wrist  Pain Orientation: Right  Aggravating Factors: sleeping, sitting   Relieving Factors: Rest     Relevant Information (PMH & Previous Tests/Imaging): none  Previous Interventions/Treatments: None     Prior Level of Function (PLOF)  Exercise/Physical Activity: golf, exercising  Work/School:   Current ADL/IADL Status: mod I     Patients Living Environment: Reviewed and no concern    Primary Language: English    Pt goals for therapy: to reduce symptoms  Red Flags:   REVIEW OF SYSTEMS/ RED FLAGS  (-) osteoporosis  (-) Cough/ Sneeze   (-) balance difficulties/FALLS   (+) numbness/tingle RUE to finger tips  (-) weakness  (-) unremitting night pain   Sleep position: supine (2 pillows)  (-) AM Stiffness         (+) Unexplained Wt. Loss  (-) h/o CA   (-) Pacemaker  (-) Bowel/Bladder    Objective:  FLOW SHEET    Cervical AROM  Cervical flexion: (80°): 60  Cervical extension: (50°): 40  Cervical rotation right: (80°): 70  Cervical rotation left: (80°): 65  Cervical sidebend right: (45°): 25  Cervical sidebend left: (45°): 28    Shoulder AROM  Shoulder AROM WFL: yes    Myotomes (MMT)  R Scapular Elevation (C4 ): (5/5): 5/5  L Scapular Elevation (C4 ): (5/5): 5/5  R Shoulder Abduction (C5 ): (5/5): 5/5  L Shoulder Abduction (C5 ): (5/5): 5/5  R Elbow Flexion (C6 ): (5/5): 5/5  L Elbow Flexion (C6 ): (5/5): 5/5  R Elbow Extension (C7 ): (5/5): 4/5  L Elbow Extension (C7 ): (5/5): 5/5  R  4-/5  L  5/5    DTR   R Biceps C5: (2+): 1  L Biceps C5: (2+): 1  R Brachioradialis C6: (2+): 1  L Brachioradialis C6: (2+): 1  R Triceps C7: (2+): 1  L Triceps C7: (2+): 1    Dermatomes  R deltoid/thumb/lateral UE (C6): WFL  L deltoid/thumb/lateral UE (C6): WFL  R digits 2,3/central UE (C7)  : diminshed  L digits 2,3/central UE (C7): WFL  R medial UE/digits 4,5 (C8 ): WFL  L medial  UE/digits 4,5 (C8): WFL    Special Tests  Transverse ligament: (Negative): neg  Alar ligament R: (Negative): neg  Alar ligament L: (Negative): neg  Cervical rotation < 60 degrees : neg  Spurling’s Test: (Negative): pos R  Cervical Distraction Test: (Negative): pos R  Median Nerve ULTT: (Negative): pos R    Wrist Special Tests  Wrist Special Tests Comment: pos reverse phalens, neg phalen's pos tinnels R wrist    Neck Disability Index: 11/50 22% self reported disability      EDUCATION: home exercise program, plan of care, activity modifications, pain management, and injury pathology       Goals:  Active       PT Problem       report 25% decrease in pain/numbness intensity in order to complete work tasks with greater ease        Start:  06/27/25    Expected End:  09/25/25            demonstrate a 2/3 muscle grade strength increase in  R hand  in order to complete typing and  a mouse without difficulty         Start:  06/27/25    Expected End:  09/25/25            Pt will demonstrate increased AROM cervical spine 10 degrees to improve driving abilities         Start:  06/27/25    Expected End:  09/25/25            demo HEP w/ at least 75% accuracy in order signify understanding of HEP for improvement of radicular symptoms        Start:  06/27/25    Expected End:  09/25/25                Plan of care was developed with input and agreement by the patient    Potential to achieve goals:  Good    Treatments:   This therapist instructed and demonstrated interventions to patient, patient completed the following under direct supervision of this therapist:  Therapeutic Exercise:   min  22 MINUTES  Therapeutic Exercise  Therapeutic Exercise Activity 1: median nerve flossing  Therapeutic Exercise Activity 2: median nerve tensioner  Therapeutic Exercise Activity 3: ball squeezes in neutral    Modalities:        Used as diagnostic not treatment  Modalities  Modality 1: Untimed Mechanical Traction (x2 mins 15 psi, x2 mins 20  psi, x2 mins 25 psi)    PT Evaluation Time Entry  PT Evaluation (Moderate) Time Entry: 26    John Yen PT    Access Code: [email or web address]  URL: [email or web address]  Date: 06/27/2025  Prepared by: John Yen    Exercises  - Median Nerve Flossing - Tray  - 2 x daily - 7 x weekly - 3 sets - 20-30 reps  - Median Nerve Tensioner  - 2 x daily - 7 x weekly - 3 sets - 20-30 reps         Current Participants as of 6/27/2025    Name Type Comments Contact Info    Alea Alejo PA-C Referring Provider  [phone]    Signature pending    John Yen PT Physical Therapist  [phone]    Electronically signed by John Yen PT at 6/27/2025 1456 EDT

## 2025-06-27 NOTE — PROGRESS NOTES
Physical Therapy  Physical Therapy Evaluation    Patient Name: Dieter Wesley  MRN: [id number]  Today's Date: 6/27/2025  Time Calculation  Start Time: 1316  Stop Time: 1404  Time Calculation (min): 48 min    Insurance:  Visit number: 1  Insurance Type: requires no auth    General  Reason for visit: General  Reason for Referral: cervical radiculaopathy  Referred By: Sapna  General Comment: pt states he has nerve pain to his finger tips, he had an episode a few years ago and it mostly went away, no its back and he has dexitrity problems, can't golf, type on a keyboard    Current Problem  1. Weakness of hand  Follow Up In Physical Therapy      2. Pinched nerve in neck  Referral to Physical Therapy          Assessment:    Pt with radicular pain and numbness into the hand (mostly numbness) and between the shoulder blades, he displays decreased  strength and mild deficits in cervical AROM.  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  His cervical radiculopathy was reproduced with repeated extension and dissipated once ceased with the pain mostly periscapular.  His numbness in the hand was only improved with median nerve flossing techniques. He would benefit from skilled therapy to improve his symptoms and improve dexterity and strength in his hand      Plan:   Treatment/Interventions: Blood flow restriction therapy, Cryotherapy, Dry needling, Education/ Instruction, Electrical stimulation, Hot pack, Manual therapy, Neuromuscular re-education, Self care/ home management, Taping techniques, Therapeutic activities, Therapeutic exercises  PT Plan: Skilled PT  PT Frequency: 1 time per week  Duration: x12 weeks  Onset Date: 06/17/25    Precautions:   Precautions  STEADI Fall Risk Score (The score of 4 or more indicates an increased risk of falling): 0  Precautions Comment: none    Medical History Form: Reviewed (scanned into chart)    Subjective:   Onset Date:  06/17/25  NISA: Insidious     Current Condition since injury:   same     Social Determinants of health: No    PAIN  Pain Assessment: 0-10  0-10 (Numeric) Pain Score: 3  Pain Type: Neuropathic pain  Pain Location: Wrist  Pain Orientation: Right  Aggravating Factors: sleeping, sitting   Relieving Factors: Rest     Relevant Information (PMH & Previous Tests/Imaging): none  Previous Interventions/Treatments: None     Prior Level of Function (PLOF)  Exercise/Physical Activity: golf, exercising  Work/School:   Current ADL/IADL Status: mod I     Patients Living Environment: Reviewed and no concern    Primary Language: English    Pt goals for therapy: to reduce symptoms  Red Flags:   REVIEW OF SYSTEMS/ RED FLAGS  (-) osteoporosis  (-) Cough/ Sneeze   (-) balance difficulties/FALLS   (+) numbness/tingle RUE to finger tips  (-) weakness  (-) unremitting night pain   Sleep position: supine (2 pillows)  (-) AM Stiffness         (+) Unexplained Wt. Loss  (-) h/o CA   (-) Pacemaker  (-) Bowel/Bladder    Objective:  FLOW SHEET    Cervical AROM  Cervical flexion: (80°): 60  Cervical extension: (50°): 40  Cervical rotation right: (80°): 70  Cervical rotation left: (80°): 65  Cervical sidebend right: (45°): 25  Cervical sidebend left: (45°): 28    Shoulder AROM  Shoulder AROM WFL: yes    Myotomes (MMT)  R Scapular Elevation (C4 ): (5/5): 5/5  L Scapular Elevation (C4 ): (5/5): 5/5  R Shoulder Abduction (C5 ): (5/5): 5/5  L Shoulder Abduction (C5 ): (5/5): 5/5  R Elbow Flexion (C6 ): (5/5): 5/5  L Elbow Flexion (C6 ): (5/5): 5/5  R Elbow Extension (C7 ): (5/5): 4/5  L Elbow Extension (C7 ): (5/5): 5/5  R  4-/5  L  5/5    DTR   R Biceps C5: (2+): 1  L Biceps C5: (2+): 1  R Brachioradialis C6: (2+): 1  L Brachioradialis C6: (2+): 1  R Triceps C7: (2+): 1  L Triceps C7: (2+): 1    Dermatomes  R deltoid/thumb/lateral UE (C6): WFL  L deltoid/thumb/lateral UE (C6): WFL  R digits 2,3/central UE (C7)  : diminshed  L  digits 2,3/central UE (C7): WFL  R medial UE/digits 4,5 (C8 ): WFL  L medial UE/digits 4,5 (C8): WFL    Special Tests  Transverse ligament: (Negative): neg  Alar ligament R: (Negative): neg  Alar ligament L: (Negative): neg  Cervical rotation < 60 degrees : neg  Spurling’s Test: (Negative): pos R  Cervical Distraction Test: (Negative): pos R  Median Nerve ULTT: (Negative): pos R    Wrist Special Tests  Wrist Special Tests Comment: pos reverse phalens, neg phalen's pos tinnels R wrist    Neck Disability Index: 11/50 22% self reported disability    Modalities:        Used as diagnostic not treatment  Modalities  Modality 1: Untimed Mechanical Traction (x2 mins 15 psi, x2 mins 20 psi, x2 mins 25 psi)      EDUCATION: home exercise program, plan of care, activity modifications, pain management, and injury pathology       Goals:  Active       PT Problem       report 25% decrease in pain/numbness intensity in order to complete work tasks with greater ease        Start:  06/27/25    Expected End:  09/25/25            demonstrate a 2/3 muscle grade strength increase in  R hand  in order to complete typing and  a mouse without difficulty         Start:  06/27/25    Expected End:  09/25/25            Pt will demonstrate increased AROM cervical spine 10 degrees to improve driving abilities         Start:  06/27/25    Expected End:  09/25/25            demo HEP w/ at least 75% accuracy in order signify understanding of HEP for improvement of radicular symptoms        Start:  06/27/25    Expected End:  09/25/25                Plan of care was developed with input and agreement by the patient    Potential to achieve goals:  Good    Treatments:   This therapist instructed and demonstrated interventions to patient, patient completed the following under direct supervision of this therapist:  Therapeutic Exercise:   min  22 MINUTES  Therapeutic Exercise  Therapeutic Exercise Activity 1: median nerve flossing  Therapeutic  Exercise Activity 2: median nerve tensioner  Therapeutic Exercise Activity 3: ball squeezes in neutral      PT Evaluation Time Entry  PT Evaluation (Moderate) Time Entry: 26    John Yen, PT    Access Code: [email or web address]  URL: [email or web address]  Date: 06/27/2025  Prepared by: John Yen    Exercises  - Median Nerve Flossing - Tray  - 2 x daily - 7 x weekly - 3 sets - 20-30 reps  - Median Nerve Tensioner  - 2 x daily - 7 x weekly - 3 sets - 20-30 reps

## 2025-07-10 ENCOUNTER — TREATMENT (OUTPATIENT)
Dept: PHYSICAL THERAPY | Facility: CLINIC | Age: 65
End: 2025-07-10
Payer: COMMERCIAL

## 2025-07-10 DIAGNOSIS — R29.898 WEAKNESS OF HAND: ICD-10-CM

## 2025-07-10 PROCEDURE — 97110 THERAPEUTIC EXERCISES: CPT | Mod: GP

## 2025-07-10 ASSESSMENT — PAIN SCALES - GENERAL: PAINLEVEL_OUTOF10: 3

## 2025-07-10 ASSESSMENT — PAIN - FUNCTIONAL ASSESSMENT: PAIN_FUNCTIONAL_ASSESSMENT: 0-10

## 2025-07-10 NOTE — PROGRESS NOTES
Physical Therapy  Physical Therapy Treatment    Patient Name: Dieter Wesley  MRN: [id number]  Today's Date: 7/10/2025  Time Calculation  Start Time: 1415  Stop Time: 1458  Time Calculation (min): 43 min    Assessment:   Pt did not have any pain with today's session but numbness continues, some cueing for form and maintaining wrist in neutral during most exercises, verbalized understanding for ergonomics and getting a brace to wear during work    Pt completed treatment with moderate effort this date    Plan:  Continue with wrist strengthening, fine motor control, and flossing techniques        General  Chief Complaint:   1. Weakness of hand  Follow Up In Physical Therapy          Subjective:     Pt reports his neck and nerve pain is slightly better however his numbness in his hand has not changed    Current Problem  General  Reason for Referral: cervical radiculaopathy  Referred By: Sapna  General Comment: visit 2    Precautions  Precautions Comment: none    Performing HEP?: Yes    Pain  Pain Assessment: 0-10  0-10 (Numeric) Pain Score: 3  Pain Type: Neuropathic pain  Pain Location: Wrist  Pain Orientation: Right    Objective:     Treatments:   This therapist instructed and demonstrated interventions to patient, patient completed the following under direct supervision of this therapist:  Therapeutic Exercise:   min  43 MINUTES  Therapeutic Exercise  Therapeutic Exercise Activity 1: median nerve flossing  Therapeutic Exercise Activity 2: median nerve tensioner  Therapeutic Exercise Activity 3: ball squeezes in neutral x30  Therapeutic Exercise Activity 4: wrist flexion yellow flex bar  Therapeutic Exercise Activity 5: wrist extension yellow flex bar  Therapeutic Exercise Activity 6: supination yellow flex bar  Therapeutic Exercise Activity 7: pronation yellow flex bar  Therapeutic Exercise Activity 8: finger extension red Xtrainer  Therapeutic Exercise Activity 9: key pinch green putty  Therapeutic Exercise  Activity 10: 3 finger pinch green putty      Active       PT Problem       report 25% decrease in pain/numbness intensity in order to complete work tasks with greater ease        Start:  06/27/25    Expected End:  09/25/25            demonstrate a 2/3 muscle grade strength increase in  R hand  in order to complete typing and  a mouse without difficulty         Start:  06/27/25    Expected End:  09/25/25            Pt will demonstrate increased AROM cervical spine 10 degrees to improve driving abilities         Start:  06/27/25    Expected End:  09/25/25            demo HEP w/ at least 75% accuracy in order signify understanding of HEP for improvement of radicular symptoms        Start:  06/27/25    Expected End:  09/25/25                Education:   Discussed motor control training, ergonomics with mouse    John Yen, PT

## 2025-07-21 ENCOUNTER — APPOINTMENT (OUTPATIENT)
Dept: PHYSICAL THERAPY | Facility: CLINIC | Age: 65
End: 2025-07-21
Payer: COMMERCIAL

## 2025-07-21 DIAGNOSIS — R29.898 WEAKNESS OF HAND: ICD-10-CM

## 2025-07-21 PROCEDURE — 97110 THERAPEUTIC EXERCISES: CPT | Mod: GP

## 2025-07-21 PROCEDURE — 97140 MANUAL THERAPY 1/> REGIONS: CPT | Mod: GP

## 2025-07-21 ASSESSMENT — PAIN SCALES - GENERAL: PAINLEVEL_OUTOF10: 3

## 2025-07-21 ASSESSMENT — PAIN - FUNCTIONAL ASSESSMENT: PAIN_FUNCTIONAL_ASSESSMENT: 0-10

## 2025-07-21 NOTE — PROGRESS NOTES
Physical Therapy  Physical Therapy Treatment    Patient Name: Dieter Welsey  MRN: [id number]  Today's Date: 7/21/2025  Time Calculation  Start Time: 0759  Stop Time: 0838  Time Calculation (min): 39 min  Insurance: requires no auth    Assessment:   AROM of neck appeared to improve after MT, no problems with hand and wrist exercises this date, he continues have no exacerbation of symptoms with today's session    Pt verbalized understanding of changes and modifications to HEP    Plan:  Continue with hand and wrist exercises        General  Chief Complaint:   1. Weakness of hand  Follow Up In Physical Therapy          Subjective:     Pt reports no change in symptoms with the hand however he hasn't had much symptoms between the shoulder blades recently and is able to keep doing stretches    Current Problem  General  Reason for Referral: weakness of hand  Referred By: Sapna  General Comment: visit 3    Precautions  Precautions Comment: none    Performing HEP?: Yes    Pain  Pain Assessment: 0-10  0-10 (Numeric) Pain Score: 3  Pain Type: Neuropathic pain  Pain Location: Wrist  Pain Orientation: Right    Objective:     Treatments:   This therapist instructed and demonstrated interventions to patient, patient completed the following under direct supervision of this therapist:  Therapeutic Exercise:   min  27 MINUTES  Therapeutic Exercise  Therapeutic Exercise Activity 1: median nerve flossing  Therapeutic Exercise Activity 2: median nerve tensioner  Therapeutic Exercise Activity 3: ball squeezes in wrist neutral x30  Therapeutic Exercise Activity 4: wrist flexion yellow flex bar  Therapeutic Exercise Activity 5: wrist extension yellow flex bar  Therapeutic Exercise Activity 6: supination yellow flex bar  Therapeutic Exercise Activity 7: pronation yellow flex bar  Therapeutic Exercise Activity 8: finger extension red Xtrainer  Therapeutic Exercise Activity 9: small ball tosses with 1kg ball, wrist up and wrist  down  Manual Therapy:       12 MINUTES  Manual Therapy  Manual Therapy Activity 1: suboccipital traction  Manual Therapy Activity 2: lateral slide glides R  Manual Therapy Activity 3: opening mobs L and R x4 each      Active       PT Problem       report 25% decrease in pain/numbness intensity in order to complete work tasks with greater ease        Start:  06/27/25    Expected End:  09/25/25            demonstrate a 2/3 muscle grade strength increase in  R hand  in order to complete typing and  a mouse without difficulty         Start:  06/27/25    Expected End:  09/25/25            Pt will demonstrate increased AROM cervical spine 10 degrees to improve driving abilities         Start:  06/27/25    Expected End:  09/25/25            demo HEP w/ at least 75% accuracy in order signify understanding of HEP for improvement of radicular symptoms        Start:  06/27/25    Expected End:  09/25/25                Education:   Discussed adding some exercises to HEP    John Yen, PT

## 2025-07-28 ENCOUNTER — APPOINTMENT (OUTPATIENT)
Dept: PHYSICAL THERAPY | Facility: CLINIC | Age: 65
End: 2025-07-28
Payer: COMMERCIAL

## 2025-07-28 DIAGNOSIS — R29.898 WEAKNESS OF HAND: ICD-10-CM

## 2025-07-28 PROCEDURE — 97140 MANUAL THERAPY 1/> REGIONS: CPT | Mod: GP

## 2025-07-28 PROCEDURE — 97110 THERAPEUTIC EXERCISES: CPT | Mod: GP

## 2025-07-28 ASSESSMENT — PAIN SCALES - GENERAL: PAINLEVEL_OUTOF10: 2

## 2025-07-28 ASSESSMENT — PAIN - FUNCTIONAL ASSESSMENT: PAIN_FUNCTIONAL_ASSESSMENT: 0-10

## 2025-07-28 NOTE — PROGRESS NOTES
Physical Therapy  Physical Therapy Treatment    Patient Name: Dieter Wesley  MRN: [id number]  Today's Date: 7/28/2025  Time Calculation  Start Time: 0845  Stop Time: 0924  Time Calculation (min): 39 min  Insurance: requires no auth    Assessment:   Pt's nerve pain in the hand is most likely from carpal tunnel symptoms as his symptoms are improving with mostly just hand and wrist exercises, he demonstrates his HEP well    Pt verbalized understanding of changes and modifications to HEP    Plan:  Continue with        General  Chief Complaint:   1. Weakness of hand  Follow Up In Physical Therapy          Subjective:     Pt states he was able to golf this past week and did not have any drops of the club,     Current Problem  General  Reason for Referral: weakness of hand  Referred By: Sapna  General Comment: visit 4    Precautions  Precautions Comment: none    Performing HEP?: Yes    Pain  Pain Assessment: 0-10  0-10 (Numeric) Pain Score: 2  Pain Type: Neuropathic pain  Pain Location: Wrist  Pain Orientation: Right    Objective:     Treatments:   This therapist instructed and demonstrated interventions to patient, patient completed the following under direct supervision of this therapist:  Therapeutic Exercise:   min  28 MINUTES  Therapeutic Exercise  Therapeutic Exercise Activity 1: median nerve flossing  Therapeutic Exercise Activity 2: median nerve tensioner  Therapeutic Exercise Activity 3: ball squeezes in wrist neutral x30  Therapeutic Exercise Activity 4: wrist flexion yellow flex bar x30, x30 red TB  Therapeutic Exercise Activity 5: wrist extension yellow flex bar x30, red TB x30  Therapeutic Exercise Activity 6: supination yellow flex bar x30, red TB x30  Therapeutic Exercise Activity 7: pronation yellow flex bar x30, red TB x30  Therapeutic Exercise Activity 8: finger extension red Xtrainer  Therapeutic Exercise Activity 9: small ball tosses with 1/2 kg ball, wrist up and wrist down  Therapeutic Exercise  Activity 10: shoulder pulleys  Manual Therapy:       11 MINUTES  Manual Therapy  Manual Therapy Activity 1: static cupping to R carpal tunnel with light ball squeezes 2x40  Manual Therapy Activity 2: dynamic cupping over carpal tunnel      Active       PT Problem       report 25% decrease in pain/numbness intensity in order to complete work tasks with greater ease        Start:  06/27/25    Expected End:  09/25/25            demonstrate a 2/3 muscle grade strength increase in  R hand  in order to complete typing and  a mouse without difficulty         Start:  06/27/25    Expected End:  09/25/25            Pt will demonstrate increased AROM cervical spine 10 degrees to improve driving abilities         Start:  06/27/25    Expected End:  09/25/25            demo HEP w/ at least 75% accuracy in order signify understanding of HEP for improvement of radicular symptoms        Start:  06/27/25    Expected End:  09/25/25                Education:   Discussed exercises with TB for resistance    John Yen, PT

## 2025-08-04 ENCOUNTER — TREATMENT (OUTPATIENT)
Dept: PHYSICAL THERAPY | Facility: CLINIC | Age: 65
End: 2025-08-04
Payer: COMMERCIAL

## 2025-08-04 DIAGNOSIS — R29.898 WEAKNESS OF HAND: ICD-10-CM

## 2025-08-04 PROCEDURE — 97110 THERAPEUTIC EXERCISES: CPT | Mod: GP

## 2025-08-04 ASSESSMENT — PAIN SCALES - GENERAL: PAINLEVEL_OUTOF10: 2

## 2025-08-04 ASSESSMENT — PAIN - FUNCTIONAL ASSESSMENT: PAIN_FUNCTIONAL_ASSESSMENT: 0-10

## 2025-08-04 NOTE — PROGRESS NOTES
Physical Therapy  Physical Therapy Treatment    Patient Name: Dieter Wesley  MRN: [id number]  Today's Date: 8/4/2025  Time Calculation  Start Time: 0800  Stop Time: 0838  Time Calculation (min): 38 min  Insurance: requires no auth    Assessment:   Pt continues to tolerate exercises with only mild fatigue, he had no problems with UB exercises this date and is well versed with his HEP    Pt verbalized understanding of changes and modifications to HEP    Plan:  Continue with hand and wrist exercises, incorporate UB strengthening       General  Chief Complaint:   1. Weakness of hand  Follow Up In Physical Therapy          Subjective:     Pt reports he continues to have less numbness in his hand    Current Problem  General  Reason for Referral: weakness of hand  Referred By: Sapna  General Comment: visit 5    Precautions  Precautions Comment: none    Performing HEP?: Yes    Pain  Pain Assessment: 0-10  0-10 (Numeric) Pain Score: 2  Pain Type: Neuropathic pain  Pain Location: Wrist  Pain Orientation: Right    Objective:     Treatments:   This therapist instructed and demonstrated interventions to patient, patient completed the following under direct supervision of this therapist:  Therapeutic Exercise:   min  38 MINUTES  Therapeutic Exercise  Therapeutic Exercise Activity 1: median nerve flossing  Therapeutic Exercise Activity 2: median nerve tensioner  Therapeutic Exercise Activity 3: ball squeezes in wrist neutral 2x30  Therapeutic Exercise Activity 4: wrist flexion red flex bar 2x30  Therapeutic Exercise Activity 5: wrist extension red flex bar  2x30  Therapeutic Exercise Activity 6: supination red flex bar 2x30  Therapeutic Exercise Activity 7: pronation red flex bar 2x30  Therapeutic Exercise Activity 8: finger extension green Xtrainer  Therapeutic Exercise Activity 9: small ball tosses with 1 kg ball, wrist up and wrist down  Therapeutic Exercise Activity 10: shoulder pulleys  Therapeutic Exercise Activity 11:  LAE single green tube x30  Therapeutic Exercise Activity 12: mid rows single green tube x30  Therapeutic Exercise Activity 13: horizontal abduction green TB      Active       PT Problem       report 25% decrease in pain/numbness intensity in order to complete work tasks with greater ease        Start:  06/27/25    Expected End:  09/25/25            demonstrate a 2/3 muscle grade strength increase in  R hand  in order to complete typing and  a mouse without difficulty         Start:  06/27/25    Expected End:  09/25/25            Pt will demonstrate increased AROM cervical spine 10 degrees to improve driving abilities         Start:  06/27/25    Expected End:  09/25/25            demo HEP w/ at least 75% accuracy in order signify understanding of HEP for improvement of radicular symptoms        Start:  06/27/25    Expected End:  09/25/25                Education:   Discussed increasing reps and sets for HEP    John Yen, PT

## 2025-08-11 ENCOUNTER — APPOINTMENT (OUTPATIENT)
Dept: PHYSICAL THERAPY | Facility: CLINIC | Age: 65
End: 2025-08-11
Payer: COMMERCIAL

## 2025-08-11 DIAGNOSIS — R29.898 WEAKNESS OF HAND: ICD-10-CM

## 2025-08-11 PROCEDURE — 97110 THERAPEUTIC EXERCISES: CPT | Mod: GP

## 2025-08-11 ASSESSMENT — PAIN - FUNCTIONAL ASSESSMENT: PAIN_FUNCTIONAL_ASSESSMENT: 0-10

## 2025-08-11 ASSESSMENT — PAIN SCALES - GENERAL: PAINLEVEL_OUTOF10: 0 - NO PAIN

## 2025-08-18 ENCOUNTER — APPOINTMENT (OUTPATIENT)
Dept: PHYSICAL THERAPY | Facility: CLINIC | Age: 65
End: 2025-08-18
Payer: COMMERCIAL

## 2025-08-18 DIAGNOSIS — R29.898 WEAKNESS OF HAND: ICD-10-CM

## 2025-08-18 PROCEDURE — 97110 THERAPEUTIC EXERCISES: CPT | Mod: GP

## 2025-08-18 ASSESSMENT — PAIN - FUNCTIONAL ASSESSMENT: PAIN_FUNCTIONAL_ASSESSMENT: 0-10

## 2025-08-18 ASSESSMENT — PAIN SCALES - GENERAL: PAINLEVEL_OUTOF10: 0 - NO PAIN

## 2025-08-22 DIAGNOSIS — E11.9 TYPE 2 DIABETES MELLITUS WITHOUT COMPLICATION, WITHOUT LONG-TERM CURRENT USE OF INSULIN: ICD-10-CM

## 2025-09-22 ENCOUNTER — APPOINTMENT (OUTPATIENT)
Dept: PRIMARY CARE | Facility: CLINIC | Age: 65
End: 2025-09-22
Payer: COMMERCIAL

## 2026-06-25 ENCOUNTER — APPOINTMENT (OUTPATIENT)
Dept: PRIMARY CARE | Facility: CLINIC | Age: 66
End: 2026-06-25
Payer: COMMERCIAL